# Patient Record
Sex: FEMALE | Race: ASIAN | NOT HISPANIC OR LATINO
[De-identification: names, ages, dates, MRNs, and addresses within clinical notes are randomized per-mention and may not be internally consistent; named-entity substitution may affect disease eponyms.]

---

## 2017-05-11 ENCOUNTER — APPOINTMENT (OUTPATIENT)
Dept: INTERNAL MEDICINE | Facility: CLINIC | Age: 57
End: 2017-05-11

## 2017-05-11 VITALS
OXYGEN SATURATION: 100 % | SYSTOLIC BLOOD PRESSURE: 150 MMHG | WEIGHT: 110 LBS | HEIGHT: 62 IN | HEART RATE: 65 BPM | DIASTOLIC BLOOD PRESSURE: 100 MMHG | BODY MASS INDEX: 20.24 KG/M2 | TEMPERATURE: 98.1 F

## 2017-05-23 ENCOUNTER — NON-APPOINTMENT (OUTPATIENT)
Age: 57
End: 2017-05-23

## 2017-05-23 ENCOUNTER — APPOINTMENT (OUTPATIENT)
Dept: INTERNAL MEDICINE | Facility: CLINIC | Age: 57
End: 2017-05-23

## 2017-05-23 VITALS
OXYGEN SATURATION: 99 % | DIASTOLIC BLOOD PRESSURE: 100 MMHG | HEIGHT: 61.5 IN | HEART RATE: 86 BPM | TEMPERATURE: 98.2 F | SYSTOLIC BLOOD PRESSURE: 150 MMHG | WEIGHT: 111 LBS | BODY MASS INDEX: 20.69 KG/M2

## 2017-05-23 VITALS — DIASTOLIC BLOOD PRESSURE: 92 MMHG | SYSTOLIC BLOOD PRESSURE: 142 MMHG

## 2017-06-22 ENCOUNTER — APPOINTMENT (OUTPATIENT)
Dept: INTERNAL MEDICINE | Facility: CLINIC | Age: 57
End: 2017-06-22

## 2017-06-22 VITALS
WEIGHT: 111 LBS | DIASTOLIC BLOOD PRESSURE: 90 MMHG | BODY MASS INDEX: 20.69 KG/M2 | TEMPERATURE: 99 F | OXYGEN SATURATION: 99 % | HEART RATE: 71 BPM | HEIGHT: 61.5 IN | SYSTOLIC BLOOD PRESSURE: 136 MMHG

## 2017-06-22 VITALS — DIASTOLIC BLOOD PRESSURE: 84 MMHG | SYSTOLIC BLOOD PRESSURE: 128 MMHG

## 2017-06-22 DIAGNOSIS — Z86.2 PERSONAL HISTORY OF DISEASES OF THE BLOOD AND BLOOD-FORMING ORGANS AND CERTAIN DISORDERS INVOLVING THE IMMUNE MECHANISM: ICD-10-CM

## 2017-07-14 LAB
ANION GAP SERPL CALC-SCNC: 15 MMOL/L
BUN SERPL-MCNC: 12 MG/DL
CALCIUM SERPL-MCNC: 9.4 MG/DL
CHLORIDE SERPL-SCNC: 100 MMOL/L
CO2 SERPL-SCNC: 28 MMOL/L
CREAT SERPL-MCNC: 0.6 MG/DL
GLUCOSE SERPL-MCNC: 77 MG/DL
POTASSIUM SERPL-SCNC: 3.3 MMOL/L
SODIUM SERPL-SCNC: 143 MMOL/L

## 2017-08-18 ENCOUNTER — RX RENEWAL (OUTPATIENT)
Age: 57
End: 2017-08-18

## 2017-11-10 ENCOUNTER — LABORATORY RESULT (OUTPATIENT)
Age: 57
End: 2017-11-10

## 2017-11-10 ENCOUNTER — APPOINTMENT (OUTPATIENT)
Dept: INTERNAL MEDICINE | Facility: CLINIC | Age: 57
End: 2017-11-10
Payer: COMMERCIAL

## 2017-11-10 ENCOUNTER — MED ADMIN CHARGE (OUTPATIENT)
Age: 57
End: 2017-11-10

## 2017-11-10 VITALS
TEMPERATURE: 98.3 F | WEIGHT: 112 LBS | DIASTOLIC BLOOD PRESSURE: 88 MMHG | SYSTOLIC BLOOD PRESSURE: 140 MMHG | OXYGEN SATURATION: 99 % | HEART RATE: 79 BPM | BODY MASS INDEX: 20.87 KG/M2 | HEIGHT: 61.5 IN

## 2017-11-10 VITALS — SYSTOLIC BLOOD PRESSURE: 144 MMHG | DIASTOLIC BLOOD PRESSURE: 88 MMHG

## 2017-11-10 DIAGNOSIS — B35.1 TINEA UNGUIUM: ICD-10-CM

## 2017-11-10 PROCEDURE — 36415 COLL VENOUS BLD VENIPUNCTURE: CPT

## 2017-11-10 PROCEDURE — 99396 PREV VISIT EST AGE 40-64: CPT | Mod: 25

## 2017-11-10 PROCEDURE — 90686 IIV4 VACC NO PRSV 0.5 ML IM: CPT

## 2017-11-10 PROCEDURE — G0008: CPT

## 2017-11-10 RX ORDER — DIFLUPREDNATE 0.5 MG/ML
0.05 EMULSION OPHTHALMIC
Qty: 5 | Refills: 0 | Status: DISCONTINUED | COMMUNITY
Start: 2017-06-15

## 2017-11-10 RX ORDER — BESIFLOXACIN 6 MG/ML
0.6 SUSPENSION OPHTHALMIC
Qty: 5 | Refills: 0 | Status: DISCONTINUED | COMMUNITY
Start: 2017-06-15

## 2017-11-21 LAB
25(OH)D3 SERPL-MCNC: 18.6 NG/ML
ALBUMIN SERPL ELPH-MCNC: 4.3 G/DL
ALP BLD-CCNC: 69 U/L
ALT SERPL-CCNC: 24 U/L
ANION GAP SERPL CALC-SCNC: 16 MMOL/L
APPEARANCE: CLEAR
AST SERPL-CCNC: 23 U/L
BASOPHILS # BLD AUTO: 0.01 K/UL
BASOPHILS NFR BLD AUTO: 0.2 %
BILIRUB SERPL-MCNC: 0.2 MG/DL
BILIRUBIN URINE: NEGATIVE
BLOOD URINE: NEGATIVE
BUN SERPL-MCNC: 12 MG/DL
CALCIUM SERPL-MCNC: 9.7 MG/DL
CHLORIDE SERPL-SCNC: 97 MMOL/L
CHOLEST SERPL-MCNC: 271 MG/DL
CHOLEST/HDLC SERPL: 4.4 RATIO
CO2 SERPL-SCNC: 27 MMOL/L
COLOR: YELLOW
CREAT SERPL-MCNC: 0.64 MG/DL
EOSINOPHIL # BLD AUTO: 0.08 K/UL
EOSINOPHIL NFR BLD AUTO: 1.5 %
GLUCOSE QUALITATIVE U: NEGATIVE MG/DL
GLUCOSE SERPL-MCNC: 67 MG/DL
HBA1C MFR BLD HPLC: 5.7 %
HCT VFR BLD CALC: 38.1 %
HDLC SERPL-MCNC: 61 MG/DL
HGB BLD-MCNC: 11.9 G/DL
IMM GRANULOCYTES NFR BLD AUTO: 0.4 %
KETONES URINE: NEGATIVE
LDLC SERPL CALC-MCNC: 139 MG/DL
LEUKOCYTE ESTERASE URINE: NEGATIVE
LYMPHOCYTES # BLD AUTO: 2.01 K/UL
LYMPHOCYTES NFR BLD AUTO: 36.5 %
MAN DIFF?: NORMAL
MCHC RBC-ENTMCNC: 21.8 PG
MCHC RBC-ENTMCNC: 31.2 GM/DL
MCV RBC AUTO: 69.8 FL
MONOCYTES # BLD AUTO: 0.4 K/UL
MONOCYTES NFR BLD AUTO: 7.3 %
NEUTROPHILS # BLD AUTO: 2.99 K/UL
NEUTROPHILS NFR BLD AUTO: 54.1 %
NITRITE URINE: NEGATIVE
PH URINE: 6.5
PLATELET # BLD AUTO: 310 K/UL
POTASSIUM SERPL-SCNC: 3.3 MMOL/L
PROT SERPL-MCNC: 7.9 G/DL
PROTEIN URINE: NEGATIVE MG/DL
RBC # BLD: 5.46 M/UL
RBC # FLD: 16.1 %
SODIUM SERPL-SCNC: 140 MMOL/L
SPECIFIC GRAVITY URINE: 1.01
T4 FREE SERPL-MCNC: 1.5 NG/DL
TRIGL SERPL-MCNC: 353 MG/DL
TSH SERPL-ACNC: 2.22 UIU/ML
UROBILINOGEN URINE: NEGATIVE MG/DL
WBC # FLD AUTO: 5.51 K/UL

## 2017-12-01 ENCOUNTER — APPOINTMENT (OUTPATIENT)
Dept: INTERNAL MEDICINE | Facility: CLINIC | Age: 57
End: 2017-12-01
Payer: COMMERCIAL

## 2017-12-01 VITALS
DIASTOLIC BLOOD PRESSURE: 100 MMHG | WEIGHT: 112 LBS | TEMPERATURE: 99.2 F | OXYGEN SATURATION: 97 % | BODY MASS INDEX: 20.87 KG/M2 | SYSTOLIC BLOOD PRESSURE: 130 MMHG | HEART RATE: 84 BPM | HEIGHT: 61.5 IN

## 2017-12-01 VITALS — SYSTOLIC BLOOD PRESSURE: 128 MMHG | DIASTOLIC BLOOD PRESSURE: 92 MMHG

## 2017-12-01 PROCEDURE — 87880 STREP A ASSAY W/OPTIC: CPT | Mod: QW

## 2017-12-01 PROCEDURE — 99214 OFFICE O/P EST MOD 30 MIN: CPT

## 2017-12-01 RX ORDER — HYDROCHLOROTHIAZIDE 25 MG/1
25 TABLET ORAL DAILY
Qty: 30 | Refills: 4 | Status: DISCONTINUED | COMMUNITY
Start: 2017-05-23 | End: 2017-12-01

## 2017-12-01 RX ORDER — POTASSIUM CHLORIDE 1500 MG/1
20 TABLET, FILM COATED, EXTENDED RELEASE ORAL
Qty: 30 | Refills: 0 | Status: DISCONTINUED | COMMUNITY
Start: 2017-11-21

## 2017-12-04 LAB — BACTERIA THROAT CULT: NORMAL

## 2017-12-14 LAB — S PYO AG SPEC QL IA: NORMAL

## 2017-12-29 ENCOUNTER — APPOINTMENT (OUTPATIENT)
Dept: INTERNAL MEDICINE | Facility: CLINIC | Age: 57
End: 2017-12-29
Payer: COMMERCIAL

## 2017-12-29 VITALS
HEIGHT: 61.5 IN | WEIGHT: 112 LBS | TEMPERATURE: 98.2 F | BODY MASS INDEX: 20.87 KG/M2 | SYSTOLIC BLOOD PRESSURE: 130 MMHG | HEART RATE: 78 BPM | OXYGEN SATURATION: 99 % | DIASTOLIC BLOOD PRESSURE: 86 MMHG

## 2017-12-29 VITALS — DIASTOLIC BLOOD PRESSURE: 84 MMHG | SYSTOLIC BLOOD PRESSURE: 132 MMHG

## 2017-12-29 PROCEDURE — 99213 OFFICE O/P EST LOW 20 MIN: CPT

## 2018-01-02 LAB
ANION GAP SERPL CALC-SCNC: 13 MMOL/L
BUN SERPL-MCNC: 13 MG/DL
CALCIUM SERPL-MCNC: 9.5 MG/DL
CHLORIDE SERPL-SCNC: 101 MMOL/L
CO2 SERPL-SCNC: 27 MMOL/L
CREAT SERPL-MCNC: 0.7 MG/DL
GLUCOSE SERPL-MCNC: 100 MG/DL
POTASSIUM SERPL-SCNC: 3.6 MMOL/L
SODIUM SERPL-SCNC: 141 MMOL/L

## 2018-02-28 ENCOUNTER — RX RENEWAL (OUTPATIENT)
Age: 58
End: 2018-02-28

## 2018-05-03 ENCOUNTER — RX RENEWAL (OUTPATIENT)
Age: 58
End: 2018-05-03

## 2018-05-29 ENCOUNTER — RX RENEWAL (OUTPATIENT)
Age: 58
End: 2018-05-29

## 2018-07-15 ENCOUNTER — FORM ENCOUNTER (OUTPATIENT)
Age: 58
End: 2018-07-15

## 2018-07-16 ENCOUNTER — OUTPATIENT (OUTPATIENT)
Dept: OUTPATIENT SERVICES | Facility: HOSPITAL | Age: 58
LOS: 1 days | End: 2018-07-16
Payer: COMMERCIAL

## 2018-07-16 ENCOUNTER — APPOINTMENT (OUTPATIENT)
Dept: MAMMOGRAPHY | Facility: IMAGING CENTER | Age: 58
End: 2018-07-16
Payer: COMMERCIAL

## 2018-07-16 DIAGNOSIS — Z00.8 ENCOUNTER FOR OTHER GENERAL EXAMINATION: ICD-10-CM

## 2018-07-16 DIAGNOSIS — M41.9 SCOLIOSIS, UNSPECIFIED: Chronic | ICD-10-CM

## 2018-07-16 PROCEDURE — 77067 SCR MAMMO BI INCL CAD: CPT

## 2018-07-16 PROCEDURE — 77063 BREAST TOMOSYNTHESIS BI: CPT

## 2018-07-16 PROCEDURE — 77067 SCR MAMMO BI INCL CAD: CPT | Mod: 26

## 2018-07-16 PROCEDURE — 77063 BREAST TOMOSYNTHESIS BI: CPT | Mod: 26

## 2018-09-28 ENCOUNTER — INPATIENT (INPATIENT)
Facility: HOSPITAL | Age: 58
LOS: 4 days | Discharge: HOME | DRG: 419 | End: 2018-10-03
Attending: SURGERY | Admitting: HOSPITALIST
Payer: SELF-PAY

## 2018-09-28 VITALS
HEART RATE: 97 BPM | OXYGEN SATURATION: 98 % | SYSTOLIC BLOOD PRESSURE: 91 MMHG | RESPIRATION RATE: 14 BRPM | DIASTOLIC BLOOD PRESSURE: 57 MMHG | TEMPERATURE: 98 F

## 2018-09-28 DIAGNOSIS — I10 ESSENTIAL (PRIMARY) HYPERTENSION: ICD-10-CM

## 2018-09-28 DIAGNOSIS — K80.50 CALCULUS OF BILE DUCT WITHOUT CHOLANGITIS OR CHOLECYSTITIS WITHOUT OBSTRUCTION: ICD-10-CM

## 2018-09-28 DIAGNOSIS — M41.9 SCOLIOSIS, UNSPECIFIED: ICD-10-CM

## 2018-09-28 DIAGNOSIS — K85.10 BILIARY ACUTE PANCREATITIS WITHOUT NECROSIS OR INFECTION: ICD-10-CM

## 2018-09-28 DIAGNOSIS — M41.9 SCOLIOSIS, UNSPECIFIED: Chronic | ICD-10-CM

## 2018-09-28 DIAGNOSIS — Z29.9 ENCOUNTER FOR PROPHYLACTIC MEASURES, UNSPECIFIED: ICD-10-CM

## 2018-09-28 DIAGNOSIS — R10.13 EPIGASTRIC PAIN: ICD-10-CM

## 2018-09-28 LAB
ALBUMIN SERPL ELPH-MCNC: 4.1 G/DL — SIGNIFICANT CHANGE UP (ref 3.3–5)
ALP SERPL-CCNC: 98 U/L — SIGNIFICANT CHANGE UP (ref 40–120)
ALT FLD-CCNC: 337 U/L — HIGH (ref 10–45)
ANION GAP SERPL CALC-SCNC: 11 MMOL/L — SIGNIFICANT CHANGE UP (ref 5–17)
APPEARANCE UR: ABNORMAL
APTT BLD: 27.7 SEC — SIGNIFICANT CHANGE UP (ref 27.5–37.4)
AST SERPL-CCNC: 545 U/L — HIGH (ref 10–40)
BACTERIA # UR AUTO: NEGATIVE — SIGNIFICANT CHANGE UP
BASE EXCESS BLDV CALC-SCNC: 1 MMOL/L — SIGNIFICANT CHANGE UP (ref -2–2)
BASOPHILS # BLD AUTO: 0 K/UL — SIGNIFICANT CHANGE UP (ref 0–0.2)
BASOPHILS NFR BLD AUTO: 0.2 % — SIGNIFICANT CHANGE UP (ref 0–2)
BILIRUB SERPL-MCNC: 1.8 MG/DL — HIGH (ref 0.2–1.2)
BILIRUB UR-MCNC: ABNORMAL
BUN SERPL-MCNC: 12 MG/DL — SIGNIFICANT CHANGE UP (ref 7–23)
CA-I SERPL-SCNC: 1.12 MMOL/L — SIGNIFICANT CHANGE UP (ref 1.12–1.3)
CALCIUM SERPL-MCNC: 9.2 MG/DL — SIGNIFICANT CHANGE UP (ref 8.4–10.5)
CHLORIDE BLDV-SCNC: 109 MMOL/L — HIGH (ref 96–108)
CHLORIDE SERPL-SCNC: 102 MMOL/L — SIGNIFICANT CHANGE UP (ref 96–108)
CO2 BLDV-SCNC: 29 MMOL/L — SIGNIFICANT CHANGE UP (ref 22–30)
CO2 SERPL-SCNC: 26 MMOL/L — SIGNIFICANT CHANGE UP (ref 22–31)
COLOR SPEC: YELLOW — SIGNIFICANT CHANGE UP
CREAT SERPL-MCNC: 0.71 MG/DL — SIGNIFICANT CHANGE UP (ref 0.5–1.3)
DIFF PNL FLD: NEGATIVE — SIGNIFICANT CHANGE UP
EOSINOPHIL # BLD AUTO: 0 K/UL — SIGNIFICANT CHANGE UP (ref 0–0.5)
EOSINOPHIL NFR BLD AUTO: 0.3 % — SIGNIFICANT CHANGE UP (ref 0–6)
EPI CELLS # UR: 1 /HPF — SIGNIFICANT CHANGE UP
GAS PNL BLDV: 137 MMOL/L — SIGNIFICANT CHANGE UP (ref 136–145)
GAS PNL BLDV: SIGNIFICANT CHANGE UP
GAS PNL BLDV: SIGNIFICANT CHANGE UP
GLUCOSE BLDV-MCNC: 131 MG/DL — HIGH (ref 70–99)
GLUCOSE SERPL-MCNC: 148 MG/DL — HIGH (ref 70–99)
GLUCOSE UR QL: ABNORMAL
HCO3 BLDV-SCNC: 27 MMOL/L — SIGNIFICANT CHANGE UP (ref 21–29)
HCT VFR BLD CALC: 35.4 % — SIGNIFICANT CHANGE UP (ref 34.5–45)
HCT VFR BLDA CALC: 32 % — LOW (ref 39–50)
HGB BLD CALC-MCNC: 10.2 G/DL — LOW (ref 11.5–15.5)
HGB BLD-MCNC: 11.4 G/DL — LOW (ref 11.5–15.5)
HYALINE CASTS # UR AUTO: 1 /LPF — SIGNIFICANT CHANGE UP (ref 0–2)
INR BLD: 1.04 RATIO — SIGNIFICANT CHANGE UP (ref 0.88–1.16)
KETONES UR-MCNC: SIGNIFICANT CHANGE UP
LACTATE BLDV-MCNC: 1.1 MMOL/L — SIGNIFICANT CHANGE UP (ref 0.7–2)
LEUKOCYTE ESTERASE UR-ACNC: NEGATIVE — SIGNIFICANT CHANGE UP
LIDOCAIN IGE QN: 175 U/L — HIGH (ref 7–60)
LYMPHOCYTES # BLD AUTO: 0.8 K/UL — LOW (ref 1–3.3)
LYMPHOCYTES # BLD AUTO: 14.6 % — SIGNIFICANT CHANGE UP (ref 13–44)
MCHC RBC-ENTMCNC: 22.1 PG — LOW (ref 27–34)
MCHC RBC-ENTMCNC: 32.2 GM/DL — SIGNIFICANT CHANGE UP (ref 32–36)
MCV RBC AUTO: 68.6 FL — LOW (ref 80–100)
MONOCYTES # BLD AUTO: 0.3 K/UL — SIGNIFICANT CHANGE UP (ref 0–0.9)
MONOCYTES NFR BLD AUTO: 5 % — SIGNIFICANT CHANGE UP (ref 2–14)
NEUTROPHILS # BLD AUTO: 4.3 K/UL — SIGNIFICANT CHANGE UP (ref 1.8–7.4)
NEUTROPHILS NFR BLD AUTO: 79.9 % — HIGH (ref 43–77)
NITRITE UR-MCNC: NEGATIVE — SIGNIFICANT CHANGE UP
PCO2 BLDV: 54 MMHG — HIGH (ref 35–50)
PH BLDV: 7.32 — LOW (ref 7.35–7.45)
PH UR: 6 — SIGNIFICANT CHANGE UP (ref 5–8)
PLATELET # BLD AUTO: 254 K/UL — SIGNIFICANT CHANGE UP (ref 150–400)
PO2 BLDV: 33 MMHG — SIGNIFICANT CHANGE UP (ref 25–45)
POTASSIUM BLDV-SCNC: 3.3 MMOL/L — LOW (ref 3.5–5.3)
POTASSIUM SERPL-MCNC: 3.7 MMOL/L — SIGNIFICANT CHANGE UP (ref 3.5–5.3)
POTASSIUM SERPL-SCNC: 3.7 MMOL/L — SIGNIFICANT CHANGE UP (ref 3.5–5.3)
PROT SERPL-MCNC: 7.1 G/DL — SIGNIFICANT CHANGE UP (ref 6–8.3)
PROT UR-MCNC: ABNORMAL
PROTHROM AB SERPL-ACNC: 11.3 SEC — SIGNIFICANT CHANGE UP (ref 9.8–12.7)
RBC # BLD: 5.16 M/UL — SIGNIFICANT CHANGE UP (ref 3.8–5.2)
RBC # FLD: 13.3 % — SIGNIFICANT CHANGE UP (ref 10.3–14.5)
RBC CASTS # UR COMP ASSIST: 7 /HPF — HIGH (ref 0–4)
SAO2 % BLDV: 51 % — LOW (ref 67–88)
SODIUM SERPL-SCNC: 139 MMOL/L — SIGNIFICANT CHANGE UP (ref 135–145)
SP GR SPEC: 1.03 — HIGH (ref 1.01–1.02)
TROPONIN T, HIGH SENSITIVITY RESULT: <6 NG/L — SIGNIFICANT CHANGE UP (ref 0–51)
TROPONIN T, HIGH SENSITIVITY RESULT: <6 NG/L — SIGNIFICANT CHANGE UP (ref 0–51)
UROBILINOGEN FLD QL: ABNORMAL
WBC # BLD: 5.4 K/UL — SIGNIFICANT CHANGE UP (ref 3.8–10.5)
WBC # FLD AUTO: 5.4 K/UL — SIGNIFICANT CHANGE UP (ref 3.8–10.5)
WBC UR QL: 3 /HPF — SIGNIFICANT CHANGE UP (ref 0–5)

## 2018-09-28 PROCEDURE — 78226 HEPATOBILIARY SYSTEM IMAGING: CPT | Mod: 26

## 2018-09-28 PROCEDURE — 99285 EMERGENCY DEPT VISIT HI MDM: CPT | Mod: 25

## 2018-09-28 PROCEDURE — 99253 IP/OBS CNSLTJ NEW/EST LOW 45: CPT | Mod: GC

## 2018-09-28 PROCEDURE — 76700 US EXAM ABDOM COMPLETE: CPT | Mod: 26

## 2018-09-28 PROCEDURE — 93010 ELECTROCARDIOGRAM REPORT: CPT

## 2018-09-28 PROCEDURE — 71045 X-RAY EXAM CHEST 1 VIEW: CPT | Mod: 26

## 2018-09-28 PROCEDURE — 99223 1ST HOSP IP/OBS HIGH 75: CPT | Mod: GC

## 2018-09-28 PROCEDURE — 74177 CT ABD & PELVIS W/CONTRAST: CPT | Mod: 26

## 2018-09-28 RX ORDER — LOSARTAN POTASSIUM 100 MG/1
0 TABLET, FILM COATED ORAL
Qty: 0 | Refills: 0 | COMMUNITY

## 2018-09-28 RX ORDER — SODIUM CHLORIDE 9 MG/ML
1000 INJECTION INTRAMUSCULAR; INTRAVENOUS; SUBCUTANEOUS
Qty: 0 | Refills: 0 | Status: DISCONTINUED | OUTPATIENT
Start: 2018-09-28 | End: 2018-10-01

## 2018-09-28 RX ORDER — ONDANSETRON 8 MG/1
4 TABLET, FILM COATED ORAL ONCE
Qty: 0 | Refills: 0 | Status: COMPLETED | OUTPATIENT
Start: 2018-09-28 | End: 2018-09-28

## 2018-09-28 RX ORDER — ENOXAPARIN SODIUM 100 MG/ML
40 INJECTION SUBCUTANEOUS EVERY 24 HOURS
Qty: 0 | Refills: 0 | Status: DISCONTINUED | OUTPATIENT
Start: 2018-09-28 | End: 2018-10-01

## 2018-09-28 RX ORDER — SODIUM CHLORIDE 9 MG/ML
1000 INJECTION INTRAMUSCULAR; INTRAVENOUS; SUBCUTANEOUS ONCE
Qty: 0 | Refills: 0 | Status: COMPLETED | OUTPATIENT
Start: 2018-09-28 | End: 2018-09-28

## 2018-09-28 RX ORDER — ONDANSETRON 8 MG/1
4 TABLET, FILM COATED ORAL EVERY 8 HOURS
Qty: 0 | Refills: 0 | Status: DISCONTINUED | OUTPATIENT
Start: 2018-09-28 | End: 2018-10-01

## 2018-09-28 RX ORDER — ACETAMINOPHEN 500 MG
650 TABLET ORAL EVERY 6 HOURS
Qty: 0 | Refills: 0 | Status: DISCONTINUED | OUTPATIENT
Start: 2018-09-28 | End: 2018-09-28

## 2018-09-28 RX ORDER — MORPHINE SULFATE 50 MG/1
2 CAPSULE, EXTENDED RELEASE ORAL EVERY 4 HOURS
Qty: 0 | Refills: 0 | Status: DISCONTINUED | OUTPATIENT
Start: 2018-09-28 | End: 2018-10-01

## 2018-09-28 RX ORDER — METOCLOPRAMIDE HCL 10 MG
10 TABLET ORAL ONCE
Qty: 0 | Refills: 0 | Status: COMPLETED | OUTPATIENT
Start: 2018-09-28 | End: 2018-09-28

## 2018-09-28 RX ORDER — PIPERACILLIN AND TAZOBACTAM 4; .5 G/20ML; G/20ML
3.38 INJECTION, POWDER, LYOPHILIZED, FOR SOLUTION INTRAVENOUS ONCE
Qty: 0 | Refills: 0 | Status: COMPLETED | OUTPATIENT
Start: 2018-09-28 | End: 2018-09-28

## 2018-09-28 RX ORDER — LOSARTAN/HYDROCHLOROTHIAZIDE 100MG-25MG
1 TABLET ORAL
Qty: 0 | Refills: 0 | COMMUNITY

## 2018-09-28 RX ORDER — MORPHINE SULFATE 50 MG/1
4 CAPSULE, EXTENDED RELEASE ORAL ONCE
Qty: 0 | Refills: 0 | Status: DISCONTINUED | OUTPATIENT
Start: 2018-09-28 | End: 2018-09-28

## 2018-09-28 RX ORDER — ACETAMINOPHEN 500 MG
1000 TABLET ORAL ONCE
Qty: 0 | Refills: 0 | Status: COMPLETED | OUTPATIENT
Start: 2018-09-28 | End: 2018-09-28

## 2018-09-28 RX ADMIN — Medication 400 MILLIGRAM(S): at 10:28

## 2018-09-28 RX ADMIN — SODIUM CHLORIDE 100 MILLILITER(S): 9 INJECTION INTRAMUSCULAR; INTRAVENOUS; SUBCUTANEOUS at 19:52

## 2018-09-28 RX ADMIN — Medication 10 MILLIGRAM(S): at 14:00

## 2018-09-28 RX ADMIN — PIPERACILLIN AND TAZOBACTAM 200 GRAM(S): 4; .5 INJECTION, POWDER, LYOPHILIZED, FOR SOLUTION INTRAVENOUS at 12:53

## 2018-09-28 RX ADMIN — ONDANSETRON 4 MILLIGRAM(S): 8 TABLET, FILM COATED ORAL at 19:51

## 2018-09-28 RX ADMIN — Medication 1000 MILLIGRAM(S): at 10:31

## 2018-09-28 RX ADMIN — ONDANSETRON 4 MILLIGRAM(S): 8 TABLET, FILM COATED ORAL at 12:53

## 2018-09-28 RX ADMIN — MORPHINE SULFATE 4 MILLIGRAM(S): 50 CAPSULE, EXTENDED RELEASE ORAL at 10:24

## 2018-09-28 RX ADMIN — Medication 1000 MILLIGRAM(S): at 10:40

## 2018-09-28 RX ADMIN — ONDANSETRON 4 MILLIGRAM(S): 8 TABLET, FILM COATED ORAL at 10:24

## 2018-09-28 RX ADMIN — SODIUM CHLORIDE 1000 MILLILITER(S): 9 INJECTION INTRAMUSCULAR; INTRAVENOUS; SUBCUTANEOUS at 10:27

## 2018-09-28 RX ADMIN — MORPHINE SULFATE 4 MILLIGRAM(S): 50 CAPSULE, EXTENDED RELEASE ORAL at 10:27

## 2018-09-28 RX ADMIN — SODIUM CHLORIDE 2000 MILLILITER(S): 9 INJECTION INTRAMUSCULAR; INTRAVENOUS; SUBCUTANEOUS at 14:01

## 2018-09-28 NOTE — ED ADULT NURSE NOTE - NSIMPLEMENTINTERV_GEN_ALL_ED
Implemented All Universal Safety Interventions:  [place] to call system. Call bell, personal items and telephone within reach. Instruct patient to call for assistance. Room bathroom lighting operational. Non-slip footwear when patient is off stretcher. Physically safe environment: no spills, clutter or unnecessary equipment. Stretcher in lowest position, wheels locked, appropriate side rails in place.

## 2018-09-28 NOTE — H&P ADULT - HISTORY OF PRESENT ILLNESS
57yoF with PMH with hypertension, diverticulitis (2016), scoliosis, C diff (2014) presenting with one day history of back pain radiating to the chest and epigastric area. Patient states yesterday morning she began with back pain radiating to her substernal chest. The pain self-resolved and then came back yesterday night after she had dinner and began radiating to her epigastrium. Pain is constant, 9/10 in severity, worse with food, improved on morphine but not on advil, patient was unable to describe the quality of the pain. Patient also reports nausea with 5 episodes of nonbloody vomiting since yesterday. Patient also reports some chills but denies fevers. Patient's last bowel movement was yesterday morning and normal. Patient denies weight loss/changes in stool color or calibur/hematochezia/recent travel/shortness of breath. 57 year old female with past medical history of hypertension, diverticulitis (2016), scoliosis, C diff (2014) presenting with one day history of back pain radiating to the chest and epigastric area. Patient states yesterday morning she began with back pain radiating to her substernal chest. The pain self-resolved and then came back yesterday night after she had dinner and began radiating to her epigastrium. Pain is constant, 9/10 in severity, worse with food, improved on morphine but not on advil, patient was unable to describe the quality of the pain. Patient also reports nausea with 5 episodes of nonbloody vomiting since yesterday. Patient also reports some chills but denies fevers. Patient's last bowel movement was yesterday morning and normal. Patient denies weight loss/changes in stool color or calibur/hematochezia/recent travel/shortness of breath.

## 2018-09-28 NOTE — CONSULT NOTE ADULT - ASSESSMENT
57 year old female with HTN, diverticulosis, and scoliosis (s/p surgery with tal placement) presented to the emergency room with chief complain of chest pain and abdominal pain for one day.     IMPRESSION  - Abdominal pain, concern for gallstones and ?choledocholithiasis  Tbili 1.8, CBD is 8mm, elevated AST and ALT, no signs of cholangitis    RECOMMENDATION    - trend CBC, CMP, INR  - please obtain a CT abdomen with contrast to rule out other causes of abdominal pain   - patient can not get an MRI given she has a metal tal in her back and she is claustrophobic, thus will plan for an EUS +/- ERCP   - please keep her NPO for now  - supportive care as per primary team    Patricia Richardson, PGY-5  GI fellow  B- [id number]/ [phone]  Please call GI fellow on call after 5pm and on weekends 57 year old female with HTN, diverticulosis, and scoliosis (s/p surgery with tal placement) presented to the emergency room with chief complain of chest pain and abdominal pain for one day.     IMPRESSION  - Abdominal pain, concern for gallstones and ?choledocholithiasis  - Acute pancreatitis, typical pain and elevated lipase  Tbili 1.8, CBD is 8mm, elevated AST and ALT, no signs of cholangitis    RECOMMENDATION    - trend CBC, CMP, INR  - please continue IVF for rx of pancreatitis   - please obtain a CT abdomen with contrast to rule out other causes of abdominal pain   - patient can not get an MRI given she has a metal tal in her back and she is claustrophobic, thus will plan for an EUS +/- ERCP   - please keep her NPO for now  - supportive care as per primary team    Patricia Richardson, PGY-5  GI fellow  B- [phone]/ [phone]  Please call GI fellow on call after 5pm and on weekends

## 2018-09-28 NOTE — ED ADULT NURSE REASSESSMENT NOTE - NS ED NURSE REASSESS COMMENT FT1
pt reports total relief from pain at this time, she is being transported off unit for u/s. will continue to monitor at return.

## 2018-09-28 NOTE — ED PROVIDER NOTE - MEDICAL DECISION MAKING DETAILS
56 yo F with pmhx htn, diverticulitis, and scoliosis presenting with chest pain and abdominal pain. PE unremarkable besides RUQ and epigastric pain. Will obtain labs, ekg, cardiac monitor, urine, cxr and US. 58 yo F with pmhx htn, diverticulitis, and scoliosis presenting with chest pain and abdominal pain. PE unremarkable besides RUQ and epigastric pain. Will obtain labs, ekg, cardiac monitor, urine, cxr and US.  Jase: See attending statement below

## 2018-09-28 NOTE — CONSULT NOTE ADULT - ASSESSMENT
Patient is a 57 year old female with history of diverticulosis/diverticulitis, HTN, scoliosis with surgery, metallic tal, who reports yesterday she began to have chest pain  radiating to her back , right side abdominal discomfort with nausea and vomiting. She reports when she tried to eat thereafter she had the pain again .   She denies fever, chills, had a BM  this morning , denies any BRBPR or melena   Elevated LFTs    Radiologist recommends HIDA to further evaluate sono findings   MRI/MRCP contraindicated with metallic tal in back ( prior scoliosis surgery )        Recommendations:  Monitor labs and trends as ordered   Repeat LFTs in am   HIDA scan to further evaluate sono findings (as recommended by radiology )  Keep NPO for HIDA   Continued recommendations pending result of HIDA and clinical course     Patricia Luna, [facility]  [address] [place] [address]  [address] [phone] Patient is a 57 year old female with history of diverticulosis/diverticulitis, HTN, scoliosis with surgery, metallic tal, who reports yesterday she began to have chest pain  radiating to her back , right side abdominal discomfort with nausea and vomiting. She reports when she tried to eat thereafter she had the pain again .   She denies fever, chills, had a BM  this morning , denies any BRBPR or melena   Elevated LFTs    Radiologist recommends HIDA to further evaluate sono findings   MRI/MRCP contraindicated with metallic tal in back ( prior scoliosis surgery )        Recommendations:  Monitor labs and trends as ordered   Repeat LFTs in am   HIDA scan to further evaluate sono findings (as recommended by radiology )  Keep NPO for HIDA   Continued recommendations pending result of HIDA and clinical course   Patient may need CT scan if HIDA non diagnostic     [facility]  [address] [place] [address]  [address] [phone]

## 2018-09-28 NOTE — H&P ADULT - ASSESSMENT
57yoF with PMH with hypertension, diverticulitis (2016), scoliosis, C diff (2014) presenting with one day history of back pain radiating to the chest and epigastric area. Patient found to have elevated lipase and gallstones with mild duct dilation. Pain likely secondary to biliary pancreatitis vs choledocholithiasis vs acute cholecystitis vs cholangitis.

## 2018-09-28 NOTE — ED ADULT NURSE NOTE - PMH
Diverticulitis of intestine without perforation or abscess with bleeding, unspecified part of intestinal tract    Hypertension    Scoliosis

## 2018-09-28 NOTE — ED PROVIDER NOTE - ATTENDING CONTRIBUTION TO CARE
57y F hx of HTN, diverticulitis, and scoliosis sp spinal surgery presenting with 2 days of epigastric pain radiating to back and RUQ pain. Pt states back and chest pain are constant and difficult for her to describe, however the RUQ pain is intermittent and worse w palpation and eating. States that she was nauseated and vomited about 5x yesterday. NO fevers. No SOB.   Gen: WNWD NAD  HEENT: NCAT EOMI   Back: midline surgical scar from upper T spine to L spine, midline TTP mid T spine   Abd: +BS soft epigastric and RUQ TTP +[place]  Ext: wwp, palp pulses, FROMx4, no cce  Neuro: A&Ox3, CN grossly intact, sensation intact, motor 5/5 throughout  AP: 57y F hx of HTN, diverticulitis, and scoliosis sp spinal surgery presenting with 2 days of epigastric pain radiating to back and RUQ pain. Suspect  biliary etiology. Check labs, sono, meds, re-eval. 57y F hx of HTN, diverticulitis, and scoliosis sp spinal surgery presenting with 2 days of epigastric pain radiating to back and RUQ pain. Pt states back and chest pain are constant and difficult for her to describe, however the RUQ pain is intermittent and worse w palpation and eating. States that she was nauseated and vomited about 5x yesterday. NO fevers. No SOB. +Fhx of CAD, early MI. Pt cathed in Dec 2016 showing minor CAD.   Gen: WNWD NAD  HEENT: NCAT EOMI   Back: midline surgical scar from upper T spine to L spine, midline TTP mid T spine   Abd: +BS soft epigastric and RUQ TTP +[place]  Ext: wwp, palp pulses, FROMx4, no cce  Neuro: A&Ox3, CN grossly intact, sensation intact, motor 5/5 throughout  AP: 57y F hx of HTN, diverticulitis, and scoliosis sp spinal surgery presenting with 2 days of epigastric pain radiating to back and RUQ pain. Suspect  biliary etiology. Check labs, sono, meds, re-eval.

## 2018-09-28 NOTE — H&P ADULT - NSHPSOCIALHISTORY_GEN_ALL_CORE
Patient lives at home with her  and has two children. 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 She is a never smoker, social drinker and denies illicit drug use. She is sexually active with her  and denies history of STDs.

## 2018-09-28 NOTE — CONSULT NOTE ADULT - SUBJECTIVE AND OBJECTIVE BOX
Chief Complaint:  Patient is a 57y old  Female who presents with a chief complaint of     HPI:    Allergies:  aspirin (Rash)      Home Medications:    Hospital Medications:      PMHX/PSHX:  Hypertension  Diverticulitis of intestine without perforation or abscess with bleeding, unspecified part of intestinal tract  Scoliosis  Scoliosis      Family history:  Family history of early CAD (Sibling)  Family history of lung cancer  Family history of hypertension      Social History:     ROS:     General:  No wt loss, fevers, chills, night sweats, fatigue,   Eyes:  Good vision, no reported pain  ENT:  No sore throat, pain, runny nose, dysphagia  CV:  No pain, palpitations, hypo/hypertension  Resp:  No dyspnea, cough, tachypnea, wheezing  GI:  See HPI  :  No pain, bleeding, incontinence, nocturia  Muscle:  No pain, weakness  Neuro:  No weakness, tingling, memory problems  Psych:  No fatigue, insomnia, mood problems, depression  Endocrine:  No polyuria, polydipsia, cold/heat intolerance  Heme:  No petechiae, ecchymosis, easy bruisability  Skin:  No rash, edema      PHYSICAL EXAM:     GENERAL:  Appears stated age, well-groomed, well-nourished, no distress  HEENT:  NC/AT,  conjunctivae clear and pink,  no JVD  CHEST:  Full & symmetric excursion, no increased effort, breath sounds clear  HEART:  Regular rhythm, S1, S2, no murmur/rub/S3/S4, no abdominal bruit, no edema  ABDOMEN:  Soft, non-tender, non-distended, normoactive bowel sounds,  no masses ,  EXTREMITIES:  no cyanosis,clubbing or edema  SKIN:  No rash/erythema/ecchymoses/petechiae/wounds/abscess/warm/dry  NEURO:  Alert, oriented    Vital Signs:  Vital Signs Last 24 Hrs  T(C): 36.6 (28 Sep 2018 10:06), Max: 36.8 (28 Sep 2018 09:54)  T(F): 97.9 (28 Sep 2018 10:06), Max: 98.2 (28 Sep 2018 09:54)  HR: 54 (28 Sep 2018 10:44) (54 - 97)  BP: 116/64 (28 Sep 2018 10:44) (91/57 - 116/64)  BP(mean): --  RR: 17 (28 Sep 2018 10:44) (14 - 18)  SpO2: 100% (28 Sep 2018 10:44) (98% - 100%)  Daily     Daily     LABS:                        11.4   5.4   )-----------( 254      ( 28 Sep 2018 10:26 )             35.4     09-28    139  |  102  |  12  ----------------------------<  148<H>  3.7   |  26  |  0.71    Ca    9.2      28 Sep 2018 10:26    TPro  7.1  /  Alb  4.1  /  TBili  1.8<H>  /  DBili  x   /  AST  545<H>  /  ALT  337<H>  /  AlkPhos  98  09-28    LIVER FUNCTIONS - ( 28 Sep 2018 10:26 )  Alb: 4.1 g/dL / Pro: 7.1 g/dL / ALK PHOS: 98 U/L / ALT: 337 U/L / AST: 545 U/L / GGT: x           PT/INR - ( 28 Sep 2018 10:26 )   PT: 11.3 sec;   INR: 1.04 ratio         PTT - ( 28 Sep 2018 10:26 )  PTT:27.7 sec    Amylase Serum--      Lipase [id number]       Ammonia--      Imaging:    < from: US Abdomen Complete (09.28.18 @ 11:42) >  FINDINGS:    Liver: Within normal limits.    Bile ducts: The common bile duct is mildly dilated and measures8 mm.    Gallbladder: Distended gallbladder containing numerous tiny mobile   gallstones. No gallbladder wall thickening or pericholecystic fluid.   Evaluation of sonographic Man sign is unreliable as the patient   received pain medication.       Pancreas: Visualized portions are within normal limits.    Spleen: 9.9 cm. Within normal limits.    Right kidney: 10.3 cm. No hydronephrosis.     Left kidney: 9.2 cm.  No hydronephrosis.     Ascites: None.    Aorta and IVC: Visualized portions are within normal limits.    IMPRESSION:     Distended gallbladder containing numerous tiny mobile gallstones. Normal   gallbladder wall thickness.  Man's sign was unreliable as the patient   had received pain medication. Further evaluation with hepatobiliary scan   is suggested to assess for cholecystitis.    Mild dilatation of the common bile duct.      < end of copied text > BILIARY GI   Chief Complaint:  Patient is a 57y old  Female who presents with a chief complaint of abdominal pain.   Primary GI: Dr Witt    HPI:  57 year old female with HTN, diverticulosis, and scoliosis (s/p surgery with tla placement) presented to the emergency room with chief complain of chest pain and abdominal pain for one day.   Patient states that her symptoms were sudden in onset, location in RUQ, epigastric area and the back. Pain is intermittent, 8/10 at its worse, worsens with food, associated with NBNB vomiting without blood. She denies fever, chills, changes in BMs, melena, BRBPR.     Allergies:  aspirin (Rash)      Home Medications:  Patient Currently Takes Medications as of 28-Sep-2018 09:58 documented in Structured Notes  · 	losartan: Last Dose Taken:    Hospital Medications:      PMHX/PSHX:  Hypertension  Diverticulitis of intestine without perforation or abscess with bleeding, unspecified part of intestinal tract  Scoliosis  Scoliosis      Family history:  Family history of early CAD (Sibling)  Family history of lung cancer  Family history of hypertension  Denies family history of colon cancer, liver cancer, uterine cancer, ovarian cancer, breast cancer, gastric ulcers, colon polyps, gallstones    Social History:   denies smoking, alcohol, drug use.       ROS:     General:  No wt loss, fevers, chills, night sweats, fatigue,   Eyes:  Good vision, no reported pain  ENT:  No sore throat, pain, runny nose, dysphagia  CV:  No pain, palpitations, hypo/hypertension  Resp:  No dyspnea, cough, tachypnea, wheezing  GI:  See HPI  :  No pain, bleeding, incontinence, nocturia  Muscle:  No pain, weakness  Neuro:  No weakness, tingling, memory problems  Psych:  No fatigue, insomnia, mood problems, depression  Endocrine:  No polyuria, polydipsia, cold/heat intolerance  Heme:  No petechiae, ecchymosis, easy bruisability  Skin:  No rash, edema      PHYSICAL EXAM:     GENERAL:  Appears stated age, well-groomed, well-nourished, no distress  HEENT:  NC/AT,  conjunctivae clear and pink,  no JVD  CHEST:  Full & symmetric excursion, no increased effort, breath sounds clear  HEART:  Regular rhythm, S1, S2, no murmur/rub/S3/S4, no abdominal bruit, no edema  ABDOMEN:  Soft, non-tender, non-distended, normoactive bowel sounds,  no masses ,  EXTREMITIES:  no cyanosis,clubbing or edema  SKIN:  No rash/erythema/ecchymoses/petechiae/wounds/abscess/warm/dry  NEURO:  Alert, oriented    Vital Signs:  Vital Signs Last 24 Hrs  T(C): 36.6 (28 Sep 2018 10:06), Max: 36.8 (28 Sep 2018 09:54)  T(F): 97.9 (28 Sep 2018 10:06), Max: 98.2 (28 Sep 2018 09:54)  HR: 54 (28 Sep 2018 10:44) (54 - 97)  BP: 116/64 (28 Sep 2018 10:44) (91/57 - 116/64)  BP(mean): --  RR: 17 (28 Sep 2018 10:44) (14 - 18)  SpO2: 100% (28 Sep 2018 10:44) (98% - 100%)  Daily     Daily     LABS:                        11.4   5.4   )-----------( 254      ( 28 Sep 2018 10:26 )             35.4     09-28    139  |  102  |  12  ----------------------------<  148<H>  3.7   |  26  |  0.71    Ca    9.2      28 Sep 2018 10:26    TPro  7.1  /  Alb  4.1  /  TBili  1.8<H>  /  DBili  x   /  AST  545<H>  /  ALT  337<H>  /  AlkPhos  98  09-28    LIVER FUNCTIONS - ( 28 Sep 2018 10:26 )  Alb: 4.1 g/dL / Pro: 7.1 g/dL / ALK PHOS: 98 U/L / ALT: 337 U/L / AST: 545 U/L / GGT: x           PT/INR - ( 28 Sep 2018 10:26 )   PT: 11.3 sec;   INR: 1.04 ratio         PTT - ( 28 Sep 2018 10:26 )  PTT:27.7 sec    Amylase Serum--      Lipase [id number]       Ammonia--      Imaging:    < from: US Abdomen Complete (09.28.18 @ 11:42) >  FINDINGS:    Liver: Within normal limits.  Bile ducts: The common bile duct is mildly dilated and measures8 mm.  Gallbladder: Distended gallbladder containing numerous tiny mobile gallstones. No gallbladder wall thickening or pericholecystic fluid. Evaluation of sonographic Man sign is unreliable as he patient received pain medication.     Pancreas: Visualized portions are within normal limits.  Spleen: 9.9 cm. Within normal limits.  Right kidney: 10.3 cm. No hydronephrosis.   Left kidney: 9.2 cm.  No hydronephrosis.   Ascites: None.  Aorta and IVC: Visualized portions are within normal limits.    IMPRESSION:   Distended gallbladder containing numerous tiny mobile gallstones. Normal gallbladder wall thickness.  Man's sign was unreliable as the patient had received pain medication. Further evaluation with hepatobiliary scan is suggested to assess for cholecystitis.  Mild dilatation of the common bile duct.    < end of copied text >

## 2018-09-28 NOTE — ED PROVIDER NOTE - PROGRESS NOTE DETAILS
Dr. Laguna:  GI fellow at bedside. Dr. Laguna:  GI fellow has asked us to call Dr Witt as pt follows w him as OP. Call placed.

## 2018-09-28 NOTE — ED PROVIDER NOTE - OBJECTIVE STATEMENT
56 yo F with pmhx htn, diverticulitis, and scoliosis presenting with chest pain x yesterday. Patient states yesterday she began with back pain radiating to her substernal chest and epigastric area. Pain is intermittent and lasts hours. pain is 8/10 and worse with food. Patient admits to 5 episodes of vomiting without blood. Patient states last bm was yesterday and was normal. Patient denies edema, sob, cough, dypnea, fever, diarrhea, melena, brbpr, dysuria, hematuria, vaginal discharge, recent travel and tingling/numbness    FHX- MI 32 yo brother passed away  last cardiologist appointment five years ago

## 2018-09-28 NOTE — CONSULT NOTE ADULT - ATTENDING COMMENTS
Ole Scott MD, FACG, [facility]  [phone]

## 2018-09-28 NOTE — CONSULT NOTE ADULT - SUBJECTIVE AND OBJECTIVE BOX
GENERAL SURGERY CONSULT NOTE    Patient is a 57y old  Female who presents with a chief complaint of abdominal pain (28 Sep 2018 18:08)    HPI:  57 year old female with past medical history of hypertension, diverticulitis (2016), scoliosis, C diff () presenting with one day history of abdominal pain. Patient's pain is localized to the epigastrium started yesterday morning. The pain self-resolved and then came back in the evening. Pain is sharp, constant, 9/10 in severity, radiating to the back, worse with food, improved on morphine but not on advil. Patient also reports nausea with 5 episodes of nonbloody vomiting since yesterday. Patient also reports some chills but denies fevers. Patient states that this is not the first time she has had similar pain. Patient's last bowel movement was yesterday morning and normal. Patient denies weight loss/changes in stool color or calibur/hematochezia/recent travel/shortness of breath.    10-points review of system performed with pertinent negative and positive findings documented in the HPI     PAST MEDICAL & SURGICAL HISTORY:  Hypertension  Diverticulitis of intestine without perforation or abscess with bleeding, unspecified part of intestinal tract  Scoliosis  Scoliosis    FAMILY HISTORY:  Family history of early CAD (Sibling)  Family history of lung cancer  Family history of hypertension    SOCIAL HISTORY: no pe    MEDICATIONS  (STANDING):  enoxaparin Injectable 40 milliGRAM(s) SubCutaneous every 24 hours  sodium chloride 0.9%. 1000 milliLiter(s) (100 mL/Hr) IV Continuous <Continuous>    MEDICATIONS  (PRN):  morphine  - Injectable 2 milliGRAM(s) IV Push every 4 hours PRN Severe Pain (7 - 10)  ondansetron Injectable 4 milliGRAM(s) IV Push every 8 hours PRN Nausea and/or Vomiting    Allergies: aspirin (Rash)    Vital Signs Last 24 Hrs  T(C): 36.9 (28 Sep 2018 20:59), Max: 37 (28 Sep 2018 14:03)  T(F): 98.5 (28 Sep 2018 20:59), Max: 98.6 (28 Sep 2018 14:03)  HR: 60 (28 Sep 2018 20:59) (54 - 97)  BP: 128/77 (28 Sep 2018 20:59) (91/57 - 128/77)  BP(mean): --  RR: 16 (28 Sep 2018 20:59) (14 - 18)  SpO2: 99% (28 Sep 2018 20:59) (98% - 100%)  Daily     Daily     Exam:  General: resting in bed in no acute distress  HEENT: no sclera icterus   Respiratory: unlabored breathing on room air   Abd: abdomen soft, nondistended, nontender to palpation over the epigastrium or right upper quadrant (patient is medicated with morphine recently)                           11.4   5.4   )-----------( 254      ( 28 Sep 2018 10:26 )             35.4         139  |  102  |  12  ----------------------------<  148<H>  3.7   |  26  |  0.71    Ca    9.2      28 Sep 2018 10:26    TPro  7.1  /  Alb  4.1  /  TBili  1.8<H>  /  DBili  x   /  AST  545<H>  /  ALT  337<H>  /  AlkPhos  98      PT/INR - ( 28 Sep 2018 10:26 )   PT: 11.3 sec;   INR: 1.04 ratio         PTT - ( 28 Sep 2018 10:26 )  PTT:27.7 sec  Urinalysis Basic - ( 28 Sep 2018 22:06 )    Color: Yellow / Appearance: Slightly Turbid / S.033 / pH: x  Gluc: x / Ketone: Trace  / Bili: Small / Urobili: 2 mg/dL   Blood: x / Protein: Trace / Nitrite: Negative   Leuk Esterase: Negative / RBC: 7 /hpf / WBC 3 /hpf   Sq Epi: x / Non Sq Epi: 1 /hpf / Bacteria: Negative    IMAGING STUDIES:  EXAM:  US ABDOMEN COMPLETE                            PROCEDURE DATE:  2018            INTERPRETATION:  CLINICAL INFORMATION: Right upper quadrant pain.   Evaluate for acute cholecystitis. Patient had received pain medication.    COMPARISON: Sonogram of the abdomen 3/20/2009. Correlation is made with   prior abdominal CT dated 2014.    TECHNIQUE: Sonography of the abdomen.     FINDINGS:    Liver: Within normal limits.    Bile ducts: The common bile duct is mildly dilated and measures8 mm.    Gallbladder: Distended gallbladder containing numerous tiny mobile   gallstones. No gallbladder wall thickening or pericholecystic fluid.   Evaluation of sonographic Man sign is unreliable as the patient   received pain medication.       Pancreas: Visualized portions are within normal limits.    Spleen: 9.9 cm. Within normal limits.    Right kidney: 10.3 cm. No hydronephrosis.     Left kidney: 9.2 cm.  No hydronephrosis.     Ascites: None.    Aorta and IVC: Visualized portions are within normal limits.    IMPRESSION:     Distended gallbladder containing numerous tiny mobile gallstones. Normal   gallbladder wall thickness.  Man's sign was unreliable as the patient   had received pain medication. Further evaluation with hepatobiliary scan   is suggested to assess for cholecystitis.    Mild dilatation of the common bile duct.

## 2018-09-28 NOTE — H&P ADULT - NSHPREVIEWOFSYSTEMS_GEN_ALL_CORE
CONSTITUTIONAL: No weakness, fevers or chills  EYES/ENT: No visual changes;  No vertigo or throat pain   NECK: No pain or stiffness  RESPIRATORY: No cough, wheezing, hemoptysis; No shortness of breath  CARDIOVASCULAR: See HPI  GASTROINTESTINAL: See HPI   GENITOURINARY: No dysuria, frequency or hematuria  NEUROLOGICAL: No numbness or weakness  SKIN: No itching, rashes

## 2018-09-28 NOTE — H&P ADULT - NSHPLABSRESULTS_GEN_ALL_CORE
LABS:                        11.4   5.4   )-----------( 254      ( 28 Sep 2018 10:26 )             35.4     09-28    139  |  102  |  12  ----------------------------<  148<H>  3.7   |  26  |  0.71    Ca    9.2      28 Sep 2018 10:26    TPro  7.1  /  Alb  4.1  /  TBili  1.8<H>  /  DBili  x   /  AST  545<H>  /  ALT  337<H>  /  AlkPhos  98  09-28    PT/INR - ( 28 Sep 2018 10:26 )   PT: 11.3 sec;   INR: 1.04 ratio         PTT - ( 28 Sep 2018 10:26 )  PTT:27.7 sec      RADIOLOGY & ADDITIONAL TESTS: Reviewed.    < from: US Abdomen Complete (09.28.18 @ 11:42) >    IMPRESSION:     Distended gallbladder containing numerous tiny mobile gallstones. Normal   gallbladder wall thickness.  Man's sign was unreliable as the patient   had received pain medication. Further evaluation with hepatobiliary scan   is suggested to assess for cholecystitis.    Mild dilatation of the common bile duct.    < end of copied text >

## 2018-09-28 NOTE — H&P ADULT - NSCORESITESY/N_GEN_A_CORE_RD
No W Plasty Text: The lesion was extirpated to the level of the fat with a #15 scalpel blade.  Given the location of the defect, shape of the defect and the proximity to free margins a W-plasty was deemed most appropriate for repair.  Using a sterile surgical marker, the appropriate transposition arms of the W-plasty were drawn incorporating the defect and placing the expected incisions within the relaxed skin tension lines where possible.    The area thus outlined was incised deep to adipose tissue with a #15 scalpel blade.  The skin margins were undermined to an appropriate distance in all directions utilizing iris scissors.  The opposing transposition arms were then transposed into place in opposite direction and anchored with interrupted buried subcutaneous sutures.

## 2018-09-28 NOTE — ED ADULT NURSE NOTE - CHIEF COMPLAINT QUOTE
back pain rad epigastric pain x 1 day C/o N/v Vomit x5 yesterday. Pt afebrile C/o chills Denies fever. Resp even and nonlab. Pt has h/o HTN on meds. Denies burn or diff urinating. Last " normal BM yesterday." Denies rectal bleeding or change in color. Abd nondist.  RUQ tenderness, increased palpitations. Brother had MI,  at 43 yrs of age. Last time pt to cardiologist was x 5 years ago.

## 2018-09-28 NOTE — H&P ADULT - PROBLEM SELECTOR PLAN 1
- biliary pancreatitis vs choledocholithiasis vs acute cholecystitis vs cholangitis.   - lipase 175, TBili 1.8, , ALT  337. Patient with normal WBC count and afebrile. Nontoxic appearing   - ultrasound shows distended gallbladder and numerous tiny mobile gallstones. Normal gallbladder wall thickness. Mild dilatation of the common bile duct. CT scan pending. HIDA pending   - holding antibiotics in the setting of normal WBC, afebrile, nontoxic appearing. history of C diff  - followed by GI, who recommend EUS +/- ERCP   - c/w NS 100cc/hr, NPO. Morphine 2mg PRN q4hrs for severe pain - biliary pancreatitis vs choledocholithiasis vs acute cholecystitis vs acute cholangitis.   - lipase 175, TBili 1.8, , ALT  337. Patient with normal WBC count and afebrile. Nontoxic appearing   - ultrasound shows distended gallbladder and numerous tiny mobile gallstones. Normal gallbladder wall thickness. Mild dilatation of the common bile duct. CT scan pending. HIDA pending   - holding antibiotics in the setting of normal WBC, afebrile, nontoxic appearing. history of C diff  - followed by GI, who recommend EUS +/- ERCP   - c/w NS 100cc/hr, NPO. Morphine 2mg PRN q4hrs for severe pain

## 2018-09-28 NOTE — ED ADULT NURSE REASSESSMENT NOTE - NS ED NURSE REASSESS COMMENT FT1
pt returned from nuc med; c/o nausea; zofran given; ct notified of pt return; pt to start drinking for ct scan at 8:15; iv ns at 100 cc pr hour infusing

## 2018-09-28 NOTE — CONSULT NOTE ADULT - ATTENDING COMMENTS
I saw and examined the pt and discussed the tx plan with the House Staff. I agree with the exam and plan as documented in the above consult which I edited as indicated.  Pt comfortable, had pain medication several hours ago.  Abdomen with mild epigastric and RUQ tenderness.  LFTs elevated, US with mildly distended GB, CBD 8 mm.  Clinical picture c/w pt passing a stone.  Await GI input.   Need to trend LFTs and would recommend further imaging - ?MRCP.  Will need eventual lap callie.   Estee Whitt MD I saw and examined the pt and discussed the tx plan with the House Staff. I agree with the exam and plan as documented in the above consult which I edited as indicated.  Pt comfortable, had pain medication several hours ago.  Abdomen with mild epigastric and RUQ tenderness.  LFTs elevated, US with mildly distended GB, CBD 8 mm.  Clinical picture c/w pt passing a stone - cannot r/o acute cholecystitis at this time however - I recommend a HIDA.  Await GI input.   Need to trend LFTs. ?MRCP.   Will need eventual lap callie.   Estee Whitt MD I saw and examined the pt and discussed the tx plan with the House Staff. I agree with the exam and plan as documented in the above consult which I edited as indicated.  Pt comfortable, had pain medication several hours ago.  Abdomen with mild epigastric and RUQ tenderness.  LFTs elevated, US with mildly distended GB, CBD 8 mm.  Clinical picture c/w pt passing a stone - cannot r/o acute cholecystitis at this time however - I recommend a HIDA.  Await GI input.   Need to trend LFTs. Unfortunately the pt cannot have MRCP. If the LFTs remain elevated, she may need EUS.  Will need lap acllie at some point, timing TBD on the clinical progress and final dx.   Estee Whitt MD

## 2018-09-28 NOTE — CONSULT NOTE ADULT - SUBJECTIVE AND OBJECTIVE BOX
Patient is a 57y old  Female who presents with a chief complaint of Abdominal pain (28 Sep 2018 12:21)      HPI:  Patient is a 57 year old female with history of diverticulosis/diverticulitis, HTN, scoliosis with surgery, metallic tal, who reports yesterday she began to have chest pain  radiating to her back , right side abdominal discomfort with nausea and vomiting. She reports when she tried to eat thereafter she had the pain again .    She denies fever, chills, had a BM  this morning , denies any BRBPR or melena        PAST MEDICAL & SURGICAL HISTORY:  Hypertension  Diverticulitis of intestine without perforation or abscess with bleeding, unspecified part of intestinal tract  Scoliosis  Scoliosis      Allergies    aspirin (Rash)    Intolerances        MEDICATIONS  (STANDING):    MEDICATIONS  (PRN):          Review of Systems:    General:  No fevers had chills yesterday    CV:  chest discomfort ( radiating to back)    Resp:  No dyspnea, cough, tachypnea, wheezing  GI:  right side / RUQ discomfort , + nausea and vomiting., no bowel disturbance    :  No hematuria   Skin:  No  jaundice , rash      Vital Signs Last 24 Hrs  T(C): 37 (28 Sep 2018 14:03), Max: 37 (28 Sep 2018 14:03)  T(F): 98.6 (28 Sep 2018 14:03), Max: 98.6 (28 Sep 2018 14:03)  HR: 60 (28 Sep 2018 14:03) (54 - 97)  BP: 114/71 (28 Sep 2018 14:03) (91/57 - 116/64)  BP(mean): --  RR: 16 (28 Sep 2018 14:03) (14 - 18)  SpO2: 100% (28 Sep 2018 14:03) (98% - 100%)    PHYSICAL EXAM:    Constitutional: non toxic ,in NAD, well-developed  Neck:  supple  Respiratory: Anterior chest wall CTA  Cardiovascular: S1 and S2, RRR  Gastrointestinal: BS+, soft, No acute tenderness on exam ( took pain Rx) , non distended ,  Extremities: No peripheral edema  Neurological: A/O x 3, no focal deficits  Psychiatric: Normal mood, normal affect  Skin: No jaundice or visible rashes        LABS:                        11.4   5.4   )-----------( 254      ( 28 Sep 2018 10:26 )             35.4     09-28    139  |  102  |  12  ----------------------------<  148<H>  3.7   |  26  |  0.71    Ca    9.2      28 Sep 2018 10:26    TPro  7.1  /  Alb  4.1  /  TBili  1.8<H>  /  DBili  x   /  AST  545<H>  /  ALT  337<H>  /  AlkPhos  98  09-28    PT/INR - ( 28 Sep 2018 10:26 )   PT: 11.3 sec;   INR: 1.04 ratio         PTT - ( 28 Sep 2018 10:26 )  PTT:27.7 sec    LIVER FUNCTIONS - ( 28 Sep 2018 10:26 )  Alb: 4.1 g/dL / Pro: 7.1 g/dL / ALK PHOS: 98 U/L / ALT: 337 U/L / AST: 545 U/L / GGT: x             RADIOLOGY & ADDITIONAL TESTS:  < from: US Abdomen Complete (09.28.18 @ 11:42) >  EXAM:  US ABDOMEN COMPLETE                            PROCEDURE DATE:  09/28/2018          INTERPRETATION:  CLINICAL INFORMATION: Right upper quadrant pain.   Evaluate for acute cholecystitis. Patient had received pain medication.    COMPARISON: Sonogram of the abdomen 3/20/2009. Correlation is made with   prior abdominal CT dated 5/13/2014.    TECHNIQUE: Sonography of the abdomen.     FINDINGS:    Liver: Within normal limits.    Bile ducts: The common bile duct is mildly dilated and measures8 mm.    Gallbladder: Distended gallbladder containing numerous tiny mobile   gallstones. No gallbladder wall thickening or pericholecystic fluid.   Evaluation of sonographic Man sign is unreliable as the patient   received pain medication.       Pancreas: Visualized portions are within normal limits.    Spleen: 9.9 cm. Within normal limits.    Right kidney: 10.3 cm. No hydronephrosis.     Left kidney: 9.2 cm.  No hydronephrosis.     Ascites: None.    Aorta and IVC: Visualized portions are within normal limits.    IMPRESSION:     Distended gallbladder containing numerous tiny mobile gallstones. Normal   gallbladder wall thickness.  Man's sign was unreliable as the patient   had received pain medication. Further evaluation with hepatobiliary scan   is suggested to assess for cholecystitis.    Mild dilatation of the common bile duct.        < end of copied text >

## 2018-09-28 NOTE — H&P ADULT - NSHPPHYSICALEXAM_GEN_ALL_CORE
General: middle aged appearing female, well nourished in no acute distress   HEENT: NC/AT; PERRL, clear conjunctiva  Neck: supple, no lymphadenopathy   Respiratory: clear to ausculation bilaterally   Cardiovascular: RRR, S1, S2. No murmurs/rubs/gallops   Abdomen: soft, nondistended. No epigastric tenderness, Man's sign negative.   Extremities: WWP, 2+ peripheral pulses b/l; no LE edema  Skin: normal color and turgor; no rash  Neurological: A&Ox3. CN2-12.   Heme: no abnormal bruising

## 2018-09-28 NOTE — CONSULT NOTE ADULT - ASSESSMENT
56 yo female presented with epigastric and right upper quadrant pain. Patient is nontender on exam (though medicated) with no sonographic evidence of inflammation, given elevated LFT and T. bili and dilated CBD, likely a passed stone vs. choledocholithiasis     No surgical intervention indicated emergently  Trend LFT, GI consult for possible choledocholithiasis   Surgery will follow   Discussed with Dr. Dawkins 56 yo female presented with epigastric and right upper quadrant pain. Patient is nontender on exam (though medicated) with no sonographic evidence of inflammation, given elevated LFT and T. bili and dilated CBD, likely a passed stone vs. choledocholithiasis,     No surgical intervention indicated emergently  Trend LFT, GI consult for possible choledocholithiasis   Surgery will follow   Discussed with Dr. Dawkins

## 2018-09-28 NOTE — H&P ADULT - PROBLEM SELECTOR PLAN 2
- ultrasound shows distended gallbladder and numerous tiny mobile gallstones. Normal gallbladder wall thickness. Mild dilatation of the common bile duct. CT scan pending. HIDA pending   - f/u HIDA scan, CT scan  - surgery following who recommend elective lap callie

## 2018-09-29 ENCOUNTER — TRANSCRIPTION ENCOUNTER (OUTPATIENT)
Age: 58
End: 2018-09-29

## 2018-09-29 LAB
ALBUMIN SERPL ELPH-MCNC: 3.4 G/DL — SIGNIFICANT CHANGE UP (ref 3.3–5)
ALP SERPL-CCNC: 82 U/L — SIGNIFICANT CHANGE UP (ref 40–120)
ALT FLD-CCNC: 308 U/L — HIGH (ref 10–45)
ANION GAP SERPL CALC-SCNC: 10 MMOL/L — SIGNIFICANT CHANGE UP (ref 5–17)
APTT BLD: 28.6 SEC — SIGNIFICANT CHANGE UP (ref 27.5–37.4)
AST SERPL-CCNC: 194 U/L — HIGH (ref 10–40)
BASOPHILS # BLD AUTO: 0.01 K/UL — SIGNIFICANT CHANGE UP (ref 0–0.2)
BASOPHILS NFR BLD AUTO: 0.2 % — SIGNIFICANT CHANGE UP (ref 0–2)
BILIRUB DIRECT SERPL-MCNC: 0.2 MG/DL — SIGNIFICANT CHANGE UP (ref 0–0.2)
BILIRUB INDIRECT FLD-MCNC: 0.6 MG/DL — SIGNIFICANT CHANGE UP (ref 0.2–1)
BILIRUB SERPL-MCNC: 0.8 MG/DL — SIGNIFICANT CHANGE UP (ref 0.2–1.2)
BUN SERPL-MCNC: 4 MG/DL — LOW (ref 7–23)
CALCIUM SERPL-MCNC: 8.3 MG/DL — LOW (ref 8.4–10.5)
CHLORIDE SERPL-SCNC: 109 MMOL/L — HIGH (ref 96–108)
CO2 SERPL-SCNC: 22 MMOL/L — SIGNIFICANT CHANGE UP (ref 22–31)
CREAT SERPL-MCNC: 0.53 MG/DL — SIGNIFICANT CHANGE UP (ref 0.5–1.3)
CULTURE RESULTS: NO GROWTH — SIGNIFICANT CHANGE UP
EOSINOPHIL # BLD AUTO: 0.03 K/UL — SIGNIFICANT CHANGE UP (ref 0–0.5)
EOSINOPHIL NFR BLD AUTO: 0.6 % — SIGNIFICANT CHANGE UP (ref 0–6)
GLUCOSE SERPL-MCNC: 90 MG/DL — SIGNIFICANT CHANGE UP (ref 70–99)
HAV IGM SER-ACNC: SIGNIFICANT CHANGE UP
HBV CORE IGM SER-ACNC: SIGNIFICANT CHANGE UP
HBV SURFACE AG SER-ACNC: SIGNIFICANT CHANGE UP
HCT VFR BLD CALC: 31.1 % — LOW (ref 34.5–45)
HCV AB S/CO SERPL IA: 0.13 S/CO — SIGNIFICANT CHANGE UP
HCV AB SERPL-IMP: SIGNIFICANT CHANGE UP
HGB BLD-MCNC: 9.7 G/DL — LOW (ref 11.5–15.5)
IMM GRANULOCYTES NFR BLD AUTO: 0.6 % — SIGNIFICANT CHANGE UP (ref 0–1.5)
INR BLD: 1.03 RATIO — SIGNIFICANT CHANGE UP (ref 0.88–1.16)
LYMPHOCYTES # BLD AUTO: 1.28 K/UL — SIGNIFICANT CHANGE UP (ref 1–3.3)
LYMPHOCYTES # BLD AUTO: 25.7 % — SIGNIFICANT CHANGE UP (ref 13–44)
MAGNESIUM SERPL-MCNC: 2.2 MG/DL — SIGNIFICANT CHANGE UP (ref 1.6–2.6)
MCHC RBC-ENTMCNC: 21.6 PG — LOW (ref 27–34)
MCHC RBC-ENTMCNC: 31.2 GM/DL — LOW (ref 32–36)
MCV RBC AUTO: 69.1 FL — LOW (ref 80–100)
MONOCYTES # BLD AUTO: 0.36 K/UL — SIGNIFICANT CHANGE UP (ref 0–0.9)
MONOCYTES NFR BLD AUTO: 7.2 % — SIGNIFICANT CHANGE UP (ref 2–14)
NEUTROPHILS # BLD AUTO: 3.27 K/UL — SIGNIFICANT CHANGE UP (ref 1.8–7.4)
NEUTROPHILS NFR BLD AUTO: 65.7 % — SIGNIFICANT CHANGE UP (ref 43–77)
PHOSPHATE SERPL-MCNC: 1.9 MG/DL — LOW (ref 2.5–4.5)
PLATELET # BLD AUTO: 238 K/UL — SIGNIFICANT CHANGE UP (ref 150–400)
POTASSIUM SERPL-MCNC: 3.3 MMOL/L — LOW (ref 3.5–5.3)
POTASSIUM SERPL-SCNC: 3.3 MMOL/L — LOW (ref 3.5–5.3)
PROT SERPL-MCNC: 5.8 G/DL — LOW (ref 6–8.3)
PROTHROM AB SERPL-ACNC: 11.7 SEC — SIGNIFICANT CHANGE UP (ref 10–13.1)
RBC # BLD: 4.5 M/UL — SIGNIFICANT CHANGE UP (ref 3.8–5.2)
RBC # FLD: 15.6 % — HIGH (ref 10.3–14.5)
SODIUM SERPL-SCNC: 141 MMOL/L — SIGNIFICANT CHANGE UP (ref 135–145)
SPECIMEN SOURCE: SIGNIFICANT CHANGE UP
WBC # BLD: 4.98 K/UL — SIGNIFICANT CHANGE UP (ref 3.8–10.5)
WBC # FLD AUTO: 4.98 K/UL — SIGNIFICANT CHANGE UP (ref 3.8–10.5)

## 2018-09-29 PROCEDURE — 99233 SBSQ HOSP IP/OBS HIGH 50: CPT

## 2018-09-29 RX ORDER — URSODIOL 250 MG/1
1 TABLET, FILM COATED ORAL
Qty: 60 | Refills: 0 | OUTPATIENT
Start: 2018-09-29 | End: 2018-10-28

## 2018-09-29 RX ORDER — URSODIOL 250 MG/1
300 TABLET, FILM COATED ORAL EVERY 12 HOURS
Qty: 0 | Refills: 0 | Status: DISCONTINUED | OUTPATIENT
Start: 2018-09-29 | End: 2018-10-01

## 2018-09-29 RX ORDER — POTASSIUM CHLORIDE 20 MEQ
40 PACKET (EA) ORAL ONCE
Qty: 0 | Refills: 0 | Status: COMPLETED | OUTPATIENT
Start: 2018-09-29 | End: 2018-09-29

## 2018-09-29 RX ORDER — SODIUM,POTASSIUM PHOSPHATES 278-250MG
1 POWDER IN PACKET (EA) ORAL
Qty: 0 | Refills: 0 | Status: COMPLETED | OUTPATIENT
Start: 2018-09-29 | End: 2018-09-30

## 2018-09-29 RX ORDER — POTASSIUM CHLORIDE 20 MEQ
20 PACKET (EA) ORAL ONCE
Qty: 0 | Refills: 0 | Status: DISCONTINUED | OUTPATIENT
Start: 2018-09-29 | End: 2018-09-29

## 2018-09-29 RX ORDER — LOSARTAN POTASSIUM 100 MG/1
25 TABLET, FILM COATED ORAL DAILY
Qty: 0 | Refills: 0 | Status: DISCONTINUED | OUTPATIENT
Start: 2018-09-29 | End: 2018-10-01

## 2018-09-29 RX ORDER — POTASSIUM CHLORIDE 20 MEQ
20 PACKET (EA) ORAL
Qty: 0 | Refills: 0 | Status: DISCONTINUED | OUTPATIENT
Start: 2018-09-29 | End: 2018-09-29

## 2018-09-29 RX ORDER — POTASSIUM PHOSPHATE, MONOBASIC POTASSIUM PHOSPHATE, DIBASIC 236; 224 MG/ML; MG/ML
15 INJECTION, SOLUTION INTRAVENOUS ONCE
Qty: 0 | Refills: 0 | Status: DISCONTINUED | OUTPATIENT
Start: 2018-09-29 | End: 2018-09-29

## 2018-09-29 RX ADMIN — URSODIOL 300 MILLIGRAM(S): 250 TABLET, FILM COATED ORAL at 21:16

## 2018-09-29 RX ADMIN — Medication 50 MILLIEQUIVALENT(S): at 12:16

## 2018-09-29 RX ADMIN — ENOXAPARIN SODIUM 40 MILLIGRAM(S): 100 INJECTION SUBCUTANEOUS at 15:55

## 2018-09-29 RX ADMIN — Medication 40 MILLIEQUIVALENT(S): at 15:53

## 2018-09-29 RX ADMIN — Medication 1 TABLET(S): at 21:16

## 2018-09-29 RX ADMIN — SODIUM CHLORIDE 65 MILLILITER(S): 9 INJECTION INTRAMUSCULAR; INTRAVENOUS; SUBCUTANEOUS at 21:16

## 2018-09-29 RX ADMIN — Medication 1 TABLET(S): at 18:26

## 2018-09-29 RX ADMIN — SODIUM CHLORIDE 65 MILLILITER(S): 9 INJECTION INTRAMUSCULAR; INTRAVENOUS; SUBCUTANEOUS at 12:16

## 2018-09-29 NOTE — DISCHARGE NOTE ADULT - NS AS ACTIVITY OBS
not while taking narcotic pain medication/Do not make important decisions/Do not drive or operate machinery/No Heavy lifting/straining

## 2018-09-29 NOTE — DISCHARGE NOTE ADULT - OTHER SIGNIFICANT FINDINGS
< from: US Abdomen Complete (09.28.18 @ 11:42) >  IMPRESSION:     Distended gallbladder containing numerous tiny mobile gallstones. Normal   gallbladder wall thickness.  Man's sign was unreliable as the patient   had received pain medication. Further evaluation with hepatobiliary scan   is suggested to assess for cholecystitis.    Mild dilatation of the common bile duct.    < end of copied text >    < from: NM Hepatobiliary Scan w/wo Gall Bladder (09.28.18 @ 19:14) >  IMPRESSION:     1. No radionuclide evidence of acute cholecystitis.    2. Delayed visualization of small bowel, seen promptly after sincalide   infusion, probably due to prolonged fasting or morphine analgesia.    Suggest clinical correlation.       < end of copied text >    < from: CT Abdomen and Pelvis w/ Oral Cont and w/ IV Cont (09.28.18 @ 22:06) >    IMPRESSION: Cholelithiasis.  Diffuse gallbladder wall thickening, which   is nonspecific in etiology.  No pericholecystic inflammatory change to   indicate acute cholecystitis.  Minimally prominent common bile duct.  No   evidence of choledocholithiasis within limits of CT technique.  No   intrahepatic or ductal dilatation.    < end of copied text >

## 2018-09-29 NOTE — DISCHARGE NOTE ADULT - CARE PLAN
Principal Discharge DX:	Acute biliary pancreatitis without infection or necrosis  Goal:	Complete resolution  Assessment and plan of treatment:	You were found to have mild pancreatitis and transaminitis likely due a stone that blocked parts of your bile duct circulation. This was likely the cause of your symptoms. The second day of admission your lab work and symptoms resolved indicating that the stone likely passed on its own. Please follow-up with a surgeon outpatient for evaluation for a gallbladder removal surgery.  Secondary Diagnosis:	Essential hypertension  Goal:	Prevention of complications  Assessment and plan of treatment:	You have a history of high blood pressure which is linked with many serious health conditions if untreated. You are taking a combination of losartan and hydrochlorothiazide, please continue to take this medication and go to your primary care doctor to manage your high blood pressure. Principal Discharge DX:	Acute biliary pancreatitis without infection or necrosis  Goal:	return to daily living activity prior to surgery, postoperative recovery  Assessment and plan of treatment:	WOUND CARE: Leave steri strips in place until they come off on their own.  Please pat area dry.   BATHING: Please do not submerge wound underwater. You may shower and/or sponge bathe.  ACTIVITY: No heavy lifting anything more than 10-15lbs or straining. Otherwise, you may return to your usual level of physical activity. If you are taking narcotic pain medication (such as Percocet), do NOT drive a car, operate machinery or make important decisions.  DIET: Low fat diet  NOTIFY YOUR SURGEON IF: You have any bleeding that does not stop, any pus draining from your wound, any fever (over 100.4 F) or chills, persistent nausea/vomiting with inability to tolerate food or liquids, persistent diarrhea, or if your pain is not controlled on your discharge pain medications.  FOLLOW-UP:  1. Please call to make a follow-up appointment within 10 days of discharge with Dr. Mann (See below for office information to call for an appointment)   2. Please follow up with your primary care physician in one week regarding your hospitalization.  Secondary Diagnosis:	Essential hypertension  Goal:	Prevention of complications  Assessment and plan of treatment:	Continue losartan and hydrochlorothiazide, please continue to take this medication and go to your primary care doctor to manage your high blood pressure.

## 2018-09-29 NOTE — PROGRESS NOTE ADULT - ASSESSMENT
Patient is a 57 year old female with history of diverticulosis/diverticulitis, HTN, scoliosis with surgery, metallic tal, who reports yesterday she began to have chest pain  radiating to her back , right side abdominal discomfort with nausea and vomiting.   Elevated LFTs  - improving    MRI/MRCP contraindicated with metallic tal in back ( prior scoliosis surgery )   CT - gallstones, no intrahepatic or ductal dilation  HIDA - negative, tracer visualized in SB  Suspect passed gallstone    Recommendations:  trial PO clears, if tolerated then can advance  Surgery following, may need eventual cholecystectomy  trend LFTs  no role for ERCP at this time  Will start empiric Ursodiol    Discussed with patient and Housestaff    Cuauhtemoc Barkley PA-C    [facility]  [phone] Patient is a 57 year old female with history of diverticulosis/diverticulitis, HTN, scoliosis with surgery, metallic tal, who reports yesterday she began to have chest pain  radiating to her back , right side abdominal discomfort with nausea and vomiting.   Elevated LFTs  - improving    MRI/MRCP contraindicated with metallic tal in back ( prior scoliosis surgery )   CT - gallstones, no intrahepatic or ductal dilation  HIDA - negative, tracer visualized in SB  Suspect passed gallstone    Recommendations:  trial PO clears, if tolerated then can advance  Surgery following, may need eventual cholecystectomy  trend LFTs  no role for ERCP at this time  Will start empiric Ursodiol    Discussed with patient and Housestaff    Cuauhtemoc Barkley PA-C    [facility]  [phone]

## 2018-09-29 NOTE — DISCHARGE NOTE ADULT - PATIENT PORTAL LINK FT
You can access the [facility] Patient Portal, offered by [facility], by registering with the following website: [email or web address]

## 2018-09-29 NOTE — PROGRESS NOTE ADULT - ASSESSMENT
A: 56 yo female presented with epigastric and right upper quadrant pain. Patient is nontender on exam (though medicated) with no sonographic evidence of inflammation, given elevated LFT and T. bili and dilated CBD, likely a passed stone vs. choledocholithiasis    P:  - No surgical intervention indicated emergently  - Trend LFT, GI consult for possible choledocholithiasis   - If LFTs uptrending, consider EUS  - Surgery will follow, rec HIDA, may need lap callie at a later point in time    Misa Harry, PGY-1  General Surgery Green Team [phone]

## 2018-09-29 NOTE — PROGRESS NOTE ADULT - SUBJECTIVE AND OBJECTIVE BOX
INTERVAL HPI/OVERNIGHT EVENTS: patient continued to feel nauseous overnight and had one episode of nonbloody vomiting.     SUBJECTIVE: Patient seen and examined at bedside. Patient reports feeling much better with resolution of her nausea and abdominal pain. Patient had one bowel movement in the morning which was normal.     CONSTITUTIONAL: No weakness, fevers or chills  EYES/ENT: No visual changes;  No vertigo or throat pain   NECK: No pain or stiffness  RESPIRATORY: No cough, wheezing, hemoptysis; No shortness of breath  CARDIOVASCULAR: No chest pain or palpitations  GASTROINTESTINAL: No abdominal or epigastric pain. No nausea, vomiting, or hematemesis; No diarrhea or constipation. No melena or hematochezia.  GENITOURINARY: No dysuria, frequency or hematuria  NEUROLOGICAL: No numbness or weakness  SKIN: No itching, rashes    OBJECTIVE:    VITAL SIGNS:  ICU Vital Signs Last 24 Hrs  T(C): 37.2 (29 Sep 2018 07:52), Max: 37.2 (29 Sep 2018 07:52)  T(F): 98.9 (29 Sep 2018 07:52), Max: 98.9 (29 Sep 2018 07:52)  HR: 64 (29 Sep 2018 07:52) (54 - 69)  BP: 122/75 (29 Sep 2018 07:52) (114/71 - 128/77)  BP(mean): --  ABP: --  ABP(mean): --  RR: 18 (29 Sep 2018 07:52) (16 - 18)  SpO2: 96% (29 Sep 2018 07:52) (95% - 100%)        CAPILLARY BLOOD GLUCOSE      Physical Exam:     General: middle aged appearing female, well nourished in no acute distress   HEENT: NC/AT; PERRL, clear conjunctiva  Neck: supple, no lymphadenopathy   Respiratory: clear to ausculation bilaterally   Cardiovascular: RRR, S1, S2. No murmurs/rubs/gallops   Abdomen: soft, nondistended. No epigastric tenderness, Man's sign negative.   Extremities: WWP, 2+ peripheral pulses b/l; no LE edema  Skin: normal color and turgor; no rash  Neurological: A&Ox3    MEDICATIONS:  MEDICATIONS  (STANDING):  enoxaparin Injectable 40 milliGRAM(s) SubCutaneous every 24 hours  potassium chloride  20 mEq/100 mL IVPB 20 milliEquivalent(s) IV Intermittent every 2 hours  potassium phosphate IVPB 15 milliMole(s) IV Intermittent once  sodium chloride 0.9%. 1000 milliLiter(s) (65 mL/Hr) IV Continuous <Continuous>    MEDICATIONS  (PRN):  morphine  - Injectable 2 milliGRAM(s) IV Push every 4 hours PRN Severe Pain (7 - 10)  ondansetron Injectable 4 milliGRAM(s) IV Push every 8 hours PRN Nausea and/or Vomiting      ALLERGIES:  Allergies    aspirin (Rash)    Intolerances    LABS:                        9.7    4.98  )-----------( 238      ( 29 Sep 2018 08:17 )             31.1         141  |  109<H>  |  4<L>  ----------------------------<  90  3.3<L>   |  22  |  0.53    Ca    8.3<L>      29 Sep 2018 07:22  Phos  1.9       Mg     2.2         TPro  5.8<L>  /  Alb  3.4  /  TBili  0.8  /  DBili  0.2  /  AST  194<H>  /  ALT  308<H>  /  AlkPhos  82      PT/INR - ( 29 Sep 2018 08:23 )   PT: 11.7 sec;   INR: 1.03 ratio         PTT - ( 29 Sep 2018 08:23 )  PTT:28.6 sec  Urinalysis Basic - ( 28 Sep 2018 22:06 )    Color: Yellow / Appearance: Slightly Turbid / S.033 / pH: x  Gluc: x / Ketone: Trace  / Bili: Small / Urobili: 2 mg/dL   Blood: x / Protein: Trace / Nitrite: Negative   Leuk Esterase: Negative / RBC: 7 /hpf / WBC 3 /hpf   Sq Epi: x / Non Sq Epi: 1 /hpf / Bacteria: Negative        RADIOLOGY & ADDITIONAL TESTS: Reviewed.

## 2018-09-29 NOTE — DISCHARGE NOTE ADULT - HOSPITAL COURSE
57 year old female with past medical history of hypertension, diverticulitis (2016), scoliosis, C diff (2014) presenting with one day history of back pain radiating to the chest and epigastric area. Patient states yesterday morning she began with back pain radiating to her substernal chest. The pain self-resolved and then came back yesterday night after she had dinner and began radiating to her epigastrium. Pain is constant, 9/10 in severity, worse with food, improved on morphine but not on advil, patient was unable to describe the quality of the pain. Patient also reports nausea with 5 episodes of nonbloody vomiting since yesterday. Patient also reports some chills but denies fevers. Patient's last bowel movement was yesterday morning and normal. Patient denies weight loss/changes in stool color or calibur/hematochezia/recent travel/shortness of breath.     Abdominal ultrasound showed a distended gallbladder containing numerous tiny mobile gallstones with normal gallbladder wall thickness, and mild dilatation of the common bile duct. HIDA scan showed no radionuclide evidence of acute cholecystitis. And abdominal CT scan showed  cholelithiasis with diffuse gallbladder wall thickening, which is nonspecific in etiology. However, it showed NO evidence or pericholecystic inflammatory change to indicate acute cholecystitis or choledocholithiasis within limits of CT technique. Lastly, there was NO evidence or intrahepatic or ductal dilatation.    Patient's labwork, pain and nausea improved on the second day of admission and patient's diet was advanced. Surgery and GI were following the patient and believed patient had a passed stone. They did not recommend any acute interventions and said patient will need an elective laparoscopic cholecystectomy. Patient was discharged with ursodiol and instructed to follow-up with surgery outpatient. 57 year old female with past medical history of hypertension, diverticulitis (2016), scoliosis, C diff (2014) presenting with one day history of back pain radiating to the chest and epigastric area. Patient states yesterday morning she began with back pain radiating to her substernal chest. The pain self-resolved and then came back yesterday night after she had dinner and began radiating to her epigastrium. Pain is constant, 9/10 in severity, worse with food, improved on morphine but not on advil, patient was unable to describe the quality of the pain. Patient also reports nausea with 5 episodes of nonbloody vomiting since yesterday. Patient also reports some chills but denies fevers. Patient's last bowel movement was yesterday morning and normal. Patient denies weight loss/changes in stool color or calibur/hematochezia/recent travel/shortness of breath.     Abdominal ultrasound showed a distended gallbladder containing numerous tiny mobile gallstones with normal gallbladder wall thickness, and mild dilatation of the common bile duct. HIDA scan showed no radionuclide evidence of acute cholecystitis. And abdominal CT scan showed  cholelithiasis with diffuse gallbladder wall thickening, which is nonspecific in etiology. However, it showed NO evidence or pericholecystic inflammatory change to indicate acute cholecystitis or choledocholithiasis within limits of CT technique. Lastly, there was NO evidence or intrahepatic or ductal dilatation.    Patient's labwork, pain and nausea improved on the second day of admission and patient's diet was advanced. Surgery and GI were following the patient and believed patient had a passed stone. Patient decided to stay in the hospital to have cholecystectomy done.  The patient tolerated the procedure well (see operative report for full details). Postoperatively, diet was advanced as tolerated. Total bilirubin normalized.  Pain was controlled with an oral pain regimen.  On day of discharge, the patient was tolerating diet, ambulating well and pain controlled. She will follow up with Dr. Mann in 10 days.

## 2018-09-29 NOTE — DISCHARGE NOTE ADULT - PLAN OF CARE
Complete resolution You were found to have mild pancreatitis and transaminitis likely due a stone that blocked parts of your bile duct circulation. This was likely the cause of your symptoms. The second day of admission your lab work and symptoms resolved indicating that the stone likely passed on its own. Please follow-up with a surgeon outpatient for evaluation for a gallbladder removal surgery. Prevention of complications You have a history of high blood pressure which is linked with many serious health conditions if untreated. You are taking a combination of losartan and hydrochlorothiazide, please continue to take this medication and go to your primary care doctor to manage your high blood pressure. return to daily living activity prior to surgery, postoperative recovery WOUND CARE: Leave steri strips in place until they come off on their own.  Please pat area dry.   BATHING: Please do not submerge wound underwater. You may shower and/or sponge bathe.  ACTIVITY: No heavy lifting anything more than 10-15lbs or straining. Otherwise, you may return to your usual level of physical activity. If you are taking narcotic pain medication (such as Percocet), do NOT drive a car, operate machinery or make important decisions.  DIET: Low fat diet  NOTIFY YOUR SURGEON IF: You have any bleeding that does not stop, any pus draining from your wound, any fever (over 100.4 F) or chills, persistent nausea/vomiting with inability to tolerate food or liquids, persistent diarrhea, or if your pain is not controlled on your discharge pain medications.  FOLLOW-UP:  1. Please call to make a follow-up appointment within 10 days of discharge with Dr. Mann (See below for office information to call for an appointment)   2. Please follow up with your primary care physician in one week regarding your hospitalization. Continue losartan and hydrochlorothiazide, please continue to take this medication and go to your primary care doctor to manage your high blood pressure.

## 2018-09-29 NOTE — PROGRESS NOTE ADULT - PROBLEM SELECTOR PLAN 2
- no evidence of choledocholithiasis on scans   - f/u HIDA scan, CT scan  - surgery following who recommend elective lap callie

## 2018-09-29 NOTE — PROGRESS NOTE ADULT - ATTENDING COMMENTS
seen, examined with house staff  - Resting, afebrile, no N/V or abdominal pain. Tolerating clears, will advance diet tomorrow    Reviewed labs, imaging  - GI and Surgery plans noted    Electrolytes supplemented  - Losartan at low dose

## 2018-09-29 NOTE — DISCHARGE NOTE ADULT - CARE PROVIDER_API CALL
Estee Whitt), ColonRectal Surgery; Surgery  [facility]  [address] [place] [id number]  Phone: [phone]  Fax: [phone]

## 2018-09-29 NOTE — PROGRESS NOTE ADULT - PROBLEM SELECTOR PLAN 1
- biliary pancreatitis vs choledocholithiasis vs acute cholecystitis vs acute cholangitis.   - currently resolved. Surgery believes that patient may have passed the stone and recommended outpt follow-up  - initial lipase 175, TBili 1.8, , ALT  337. Repeat Tbili 0.6, , .   - Patient with normal WBC count and afebrile. Nontoxic appearing   - ultrasound shows distended gallbladder and numerous tiny mobile gallstones. Normal gallbladder wall thickness. Mild dilatation of the common bile duct. CT scan shows stones but no evidence of acute callie. HIDA shows no evidence of acute callie   - holding antibiotics in the setting of normal WBC, afebrile, nontoxic appearing. history of C diff  - c/w NS 65cc/hr, NPO. Morphine 2mg PRN q4hrs for severe pain

## 2018-09-29 NOTE — PROGRESS NOTE ADULT - ATTENDING COMMENTS
Ole Scott MD, FACG, [facility]  [phone] Surgery note reviewed.  Agree with EUS to r/o choledocholithiasis if not improving clinically or if LFTs starting rising.    Ole Scott MD, FACG, [facility]  [phone]

## 2018-09-29 NOTE — DISCHARGE NOTE ADULT - ADDITIONAL INSTRUCTIONS
Please follow-up with Dr. Estee Whitt in her office within 2 weeks to for evaluation for a laparoscopic cholecystectomy. Please follow-up with your primary care physician within 2 weeks.     If you have similar symptoms as you did in this admission again please return to the hospital immediately. If you have similar symptoms as you did in this admission again please return to the hospital immediately.

## 2018-09-29 NOTE — PROGRESS NOTE ADULT - SUBJECTIVE AND OBJECTIVE BOX
Patient is a 57y old  Female who presented with a chief complaint of abdominal pain (29 Sep 2018 10:41)      INTERVAL HPI/OVERNIGHT EVENTS:  no further abdominal pain  feels hungry  no fever, chills, nausea or vomiting    MEDICATIONS  (STANDING):  enoxaparin Injectable 40 milliGRAM(s) SubCutaneous every 24 hours  potassium chloride  20 mEq/100 mL IVPB 20 milliEquivalent(s) IV Intermittent every 2 hours  potassium phosphate IVPB 15 milliMole(s) IV Intermittent once  sodium chloride 0.9%. 1000 milliLiter(s) (65 mL/Hr) IV Continuous <Continuous>    MEDICATIONS  (PRN):  morphine  - Injectable 2 milliGRAM(s) IV Push every 4 hours PRN Severe Pain (7 - 10)  ondansetron Injectable 4 milliGRAM(s) IV Push every 8 hours PRN Nausea and/or Vomiting      Allergies  aspirin (Rash)    Review of Systems:  Gen: no fever or chills  CV: no CP or SOB  Resp: no dyspnea, cough  Ext: no swelling or pain  Skin: no rash or pruritis      Vital Signs Last 24 Hrs  T(C): 37.2 (29 Sep 2018 07:52), Max: 37.2 (29 Sep 2018 07:52)  T(F): 98.9 (29 Sep 2018 07:52), Max: 98.9 (29 Sep 2018 07:52)  HR: 64 (29 Sep 2018 07:52) (60 - 69)  BP: 122/75 (29 Sep 2018 07:52) (114/71 - 128/77)  BP(mean): --  RR: 18 (29 Sep 2018 07:52) (16 - 18)  SpO2: 96% (29 Sep 2018 07:52) (95% - 100%)    PHYSICAL EXAM:  Constitutional: non toxic ,in NAD, well-developed  Neck:  supple  Respiratory: clear b/l  Cardiovascular: S1 and S2, RRR  Gastrointestinal: BS+, soft, non tender, non distended , no rebound guarding or rigidity  Extremities: No peripheral edema  Neurological: A/O x 3, no focal deficits  Psychiatric: Normal mood, normal affect  Skin: No jaundice or visible rashes    LABS:                        9.7    4.98  )-----------( 238      ( 29 Sep 2018 08:17 )             31.1     09-29    141  |  109<H>  |  4<L>  ----------------------------<  90  3.3<L>   |  22  |  0.53    Ca    8.3<L>      29 Sep 2018 07:22  Phos  1.9     09-29  Mg     2.2     09-29    TPro  5.8<L>  /  Alb  3.4  /  TBili  0.8  /  DBili  0.2  /  AST  194<H>  /  ALT  308<H>  /  AlkPhos  82  09-29    Aspartate Aminotransferase (AST/SGOT): 545 U/L (09.28.18 @ 10:26)    Alanine Aminotransferase (ALT/SGPT): 337 U/L (09.28.18 @ 10:26)        PT/INR - ( 29 Sep 2018 08:23 )   PT: 11.7 sec;   INR: 1.03 ratio    PTT - ( 29 Sep 2018 08:23 )  PTT:28.6 sec      RADIOLOGY & ADDITIONAL TESTS:  < from: NM Hepatobiliary Scan w/wo Gall Bladder (09.28.18 @ 19:14) >  FINDINGS: There is prompt, homogeneous uptake of radiopharmaceutical by   the hepatocytes. Activity is first seen in the gallbladder at about 25   minutes. There was no visualization of bowel at 2 hrs and 6hrs, but   prompt visualization following sincalide infusion. There is good   clearance of activity from the liver by the end of the study.    IMPRESSION:     1. No radionuclide evidence of acute cholecystitis.    2. Delayed visualization of small bowel, seen promptly after sincalide   infusion, probably due to prolonged fasting or morphine analgesia.    Suggest clinical correlation.       < from: CT Abdomen and Pelvis w/ Oral Cont and w/ IV Cont (09.28.18 @ 22:06) >  FINDINGS:    LOWER CHEST: Within normal limits.    LIVER: 7 mm hypodense focus in segment 7 (3:16) is too small to   characterize by CT scan.  BILE DUCTS: The suprapancreatic common bile duct measures 7-8 mm and   tapers to 6 mm at the pancreatic head..  GALLBLADDER: Cholelithiasis.  Diffuse gallbladder wall thickening.  SPLEEN: Within normal limits.  PANCREAS: Within normal limits.  ADRENALS: Within normal limits.  KIDNEYS/URETERS: There is excretion of IV contrast into the renal   collecting systems.  A 4 mm hypodense focus in the upper pole the right   kidney is too small to characterize by CT scan.    BLADDER: Within normal limits.  REPRODUCTIVE ORGANS: No uterine or adnexal mass.    BOWEL: No bowel obstruction.  Normal appendix.  PERITONEUM: No ascites.  VESSELS: Minimal atherosclerotic calcification of the aortoiliac tree.  RETROPERITONEUM: No lymphadenopathy.    ABDOMINAL WALL: Within normal limits.  BONES: Thoracolumbar scoliosis with partially visualized right-sided   Garvey tal in place.    IMPRESSION: Cholelithiasis.  Diffuse gallbladder wall thickening, which   is nonspecific in etiology.  No pericholecystic inflammatory change to   indicate acute cholecystitis.  Minimally prominent common bile duct.  No   evidence of choledocholithiasis within limits of CT technique.  No   intrahepatic or ductal dilatation.

## 2018-09-29 NOTE — PROGRESS NOTE ADULT - SUBJECTIVE AND OBJECTIVE BOX
General Surgery Progress Note    SUBJECTIVE:  The patient was seen and examined. No acute events overnight.     OBJECTIVE:     ** VITAL SIGNS / I&O's **    Vital Signs Last 24 Hrs  T(C): 36.8 (28 Sep 2018 23:41), Max: 37 (28 Sep 2018 14:03)  T(F): 98.2 (28 Sep 2018 23:41), Max: 98.6 (28 Sep 2018 14:03)  HR: 69 (28 Sep 2018 23:41) (54 - 97)  BP: 126/74 (28 Sep 2018 23:41) (91/57 - 128/77)  BP(mean): --  RR: 16 (28 Sep 2018 23:41) (14 - 18)  SpO2: 95% (28 Sep 2018 23:41) (95% - 100%)        ** PHYSICAL EXAM **    -- CONSTITUTIONAL: Alert, NAD  -- PULMONARY: non-labored respirations  -- ABDOMEN: soft, non-distended, mildly tender over epigastrium/RUQ  -- NEURO: A&Ox3    ** LABS **                          11.4   5.4   )-----------( 254      ( 28 Sep 2018 10:26 )             35.4     28 Sep 2018 10:26    139    |  102    |  12     ----------------------------<  148    3.7     |  26     |  0.71     Ca    9.2        28 Sep 2018 10:26    TPro  7.1    /  Alb  4.1    /  TBili  1.8    /  DBili  x      /  AST  545    /  ALT  337    /  AlkPhos  98     28 Sep 2018 10:26    PT/INR - ( 28 Sep 2018 10:26 )   PT: 11.3 sec;   INR: 1.04 ratio         PTT - ( 28 Sep 2018 10:26 )  PTT:27.7 sec  CAPILLARY BLOOD GLUCOSE            LIVER FUNCTIONS - ( 28 Sep 2018 10:26 )  Alb: 4.1 g/dL / Pro: 7.1 g/dL / ALK PHOS: 98 U/L / ALT: 337 U/L / AST: 545 U/L / GGT: x             Urinalysis Basic - ( 28 Sep 2018 22:06 )    Color: Yellow / Appearance: Slightly Turbid / S.033 / pH: x  Gluc: x / Ketone: Trace  / Bili: Small / Urobili: 2 mg/dL   Blood: x / Protein: Trace / Nitrite: Negative   Leuk Esterase: Negative / RBC: 7 /hpf / WBC 3 /hpf   Sq Epi: x / Non Sq Epi: 1 /hpf / Bacteria: Negative        MEDICATIONS  (STANDING):  enoxaparin Injectable 40 milliGRAM(s) SubCutaneous every 24 hours  sodium chloride 0.9%. 1000 milliLiter(s) (100 mL/Hr) IV Continuous <Continuous>    MEDICATIONS  (PRN):  morphine  - Injectable 2 milliGRAM(s) IV Push every 4 hours PRN Severe Pain (7 - 10)  ondansetron Injectable 4 milliGRAM(s) IV Push every 8 hours PRN Nausea and/or Vomiting

## 2018-09-29 NOTE — DISCHARGE NOTE ADULT - MEDICATION SUMMARY - MEDICATIONS TO TAKE
I will START or STAY ON the medications listed below when I get home from the hospital:    losartan-hydrochlorothiazide 50mg-12.5mg oral tablet  -- 1 tab(s) by mouth once a day  -- Indication: For High blood pressure    ursodiol 300 mg oral capsule  -- 1 cap(s) by mouth every 12 hours  -- Indication: For Helps dissolve gallstones I will START or STAY ON the medications listed below when I get home from the hospital:    acetaminophen 325 mg oral tablet  -- 2 tab(s) by mouth every 6 hours  -- Indication: For Pain    oxyCODONE 5 mg oral tablet  -- 1 tab(s) by mouth every 6 hours, As needed, Moderate Pain (4 - 6) MDD:4  -- Indication: For Pain    losartan-hydrochlorothiazide 50mg-12.5mg oral tablet  -- 1 tab(s) by mouth once a day  -- Indication: For High blood pressure

## 2018-09-30 LAB
ALBUMIN SERPL ELPH-MCNC: 4.2 G/DL — SIGNIFICANT CHANGE UP (ref 3.3–5)
ALP SERPL-CCNC: 82 U/L — SIGNIFICANT CHANGE UP (ref 40–120)
ALT FLD-CCNC: 228 U/L — HIGH (ref 10–45)
ANION GAP SERPL CALC-SCNC: 9 MMOL/L — SIGNIFICANT CHANGE UP (ref 5–17)
APPEARANCE UR: CLEAR — SIGNIFICANT CHANGE UP
APTT BLD: 31.2 SEC — SIGNIFICANT CHANGE UP (ref 27.5–37.4)
AST SERPL-CCNC: 81 U/L — HIGH (ref 10–40)
BILIRUB DIRECT SERPL-MCNC: 0.2 MG/DL — SIGNIFICANT CHANGE UP (ref 0–0.2)
BILIRUB INDIRECT FLD-MCNC: 0.6 MG/DL — SIGNIFICANT CHANGE UP (ref 0.2–1)
BILIRUB SERPL-MCNC: 0.8 MG/DL — SIGNIFICANT CHANGE UP (ref 0.2–1.2)
BILIRUB UR-MCNC: NEGATIVE — SIGNIFICANT CHANGE UP
BLD GP AB SCN SERPL QL: NEGATIVE — SIGNIFICANT CHANGE UP
BUN SERPL-MCNC: 4 MG/DL — LOW (ref 7–23)
CALCIUM SERPL-MCNC: 9.1 MG/DL — SIGNIFICANT CHANGE UP (ref 8.4–10.5)
CHLORIDE SERPL-SCNC: 106 MMOL/L — SIGNIFICANT CHANGE UP (ref 96–108)
CO2 SERPL-SCNC: 26 MMOL/L — SIGNIFICANT CHANGE UP (ref 22–31)
COLOR SPEC: YELLOW — SIGNIFICANT CHANGE UP
CREAT SERPL-MCNC: 0.57 MG/DL — SIGNIFICANT CHANGE UP (ref 0.5–1.3)
DIFF PNL FLD: NEGATIVE — SIGNIFICANT CHANGE UP
GLUCOSE SERPL-MCNC: 87 MG/DL — SIGNIFICANT CHANGE UP (ref 70–99)
GLUCOSE UR QL: NEGATIVE MG/DL — SIGNIFICANT CHANGE UP
HCT VFR BLD CALC: 34.1 % — LOW (ref 34.5–45)
HGB BLD-MCNC: 10.8 G/DL — LOW (ref 11.5–15.5)
INR BLD: 0.98 RATIO — SIGNIFICANT CHANGE UP (ref 0.88–1.16)
KETONES UR-MCNC: NEGATIVE — SIGNIFICANT CHANGE UP
LEUKOCYTE ESTERASE UR-ACNC: NEGATIVE — SIGNIFICANT CHANGE UP
MAGNESIUM SERPL-MCNC: 2.3 MG/DL — SIGNIFICANT CHANGE UP (ref 1.6–2.6)
MCHC RBC-ENTMCNC: 21.8 PG — LOW (ref 27–34)
MCHC RBC-ENTMCNC: 31.7 GM/DL — LOW (ref 32–36)
MCV RBC AUTO: 68.8 FL — LOW (ref 80–100)
NITRITE UR-MCNC: NEGATIVE — SIGNIFICANT CHANGE UP
PH UR: 6.5 — SIGNIFICANT CHANGE UP (ref 5–8)
PHOSPHATE SERPL-MCNC: 2.6 MG/DL — SIGNIFICANT CHANGE UP (ref 2.5–4.5)
PLATELET # BLD AUTO: 238 K/UL — SIGNIFICANT CHANGE UP (ref 150–400)
POTASSIUM SERPL-MCNC: 3.7 MMOL/L — SIGNIFICANT CHANGE UP (ref 3.5–5.3)
POTASSIUM SERPL-SCNC: 3.7 MMOL/L — SIGNIFICANT CHANGE UP (ref 3.5–5.3)
PROT SERPL-MCNC: 7 G/DL — SIGNIFICANT CHANGE UP (ref 6–8.3)
PROT UR-MCNC: NEGATIVE MG/DL — SIGNIFICANT CHANGE UP
PROTHROM AB SERPL-ACNC: 11.1 SEC — SIGNIFICANT CHANGE UP (ref 10–13.1)
RBC # BLD: 4.96 M/UL — SIGNIFICANT CHANGE UP (ref 3.8–5.2)
RBC # FLD: 15.9 % — HIGH (ref 10.3–14.5)
RH IG SCN BLD-IMP: POSITIVE — SIGNIFICANT CHANGE UP
SODIUM SERPL-SCNC: 141 MMOL/L — SIGNIFICANT CHANGE UP (ref 135–145)
SP GR SPEC: 1.01 — LOW (ref 1.01–1.02)
UROBILINOGEN FLD QL: 1 MG/DL — SIGNIFICANT CHANGE UP
WBC # BLD: 3.84 K/UL — SIGNIFICANT CHANGE UP (ref 3.8–10.5)
WBC # FLD AUTO: 3.84 K/UL — SIGNIFICANT CHANGE UP (ref 3.8–10.5)

## 2018-09-30 PROCEDURE — 99233 SBSQ HOSP IP/OBS HIGH 50: CPT | Mod: GC

## 2018-09-30 RX ORDER — IBUPROFEN 200 MG
400 TABLET ORAL ONCE
Qty: 0 | Refills: 0 | Status: COMPLETED | OUTPATIENT
Start: 2018-09-30 | End: 2018-09-30

## 2018-09-30 RX ADMIN — LOSARTAN POTASSIUM 25 MILLIGRAM(S): 100 TABLET, FILM COATED ORAL at 06:26

## 2018-09-30 RX ADMIN — Medication 400 MILLIGRAM(S): at 14:00

## 2018-09-30 RX ADMIN — Medication 400 MILLIGRAM(S): at 21:10

## 2018-09-30 RX ADMIN — SODIUM CHLORIDE 65 MILLILITER(S): 9 INJECTION INTRAMUSCULAR; INTRAVENOUS; SUBCUTANEOUS at 20:34

## 2018-09-30 RX ADMIN — URSODIOL 300 MILLIGRAM(S): 250 TABLET, FILM COATED ORAL at 20:34

## 2018-09-30 RX ADMIN — ENOXAPARIN SODIUM 40 MILLIGRAM(S): 100 INJECTION SUBCUTANEOUS at 15:54

## 2018-09-30 RX ADMIN — Medication 1 TABLET(S): at 12:29

## 2018-09-30 RX ADMIN — SODIUM CHLORIDE 65 MILLILITER(S): 9 INJECTION INTRAMUSCULAR; INTRAVENOUS; SUBCUTANEOUS at 09:17

## 2018-09-30 RX ADMIN — Medication 400 MILLIGRAM(S): at 13:07

## 2018-09-30 RX ADMIN — Medication 1 TABLET(S): at 09:17

## 2018-09-30 RX ADMIN — Medication 400 MILLIGRAM(S): at 22:10

## 2018-09-30 RX ADMIN — URSODIOL 300 MILLIGRAM(S): 250 TABLET, FILM COATED ORAL at 10:31

## 2018-09-30 NOTE — PROGRESS NOTE ADULT - PROBLEM SELECTOR PLAN 5
DVT PPx - lovenox 40mg QD   Diet - NPO  Dispo - discharge today DVT PPx - lovenox 40mg QD   Diet - clears, NPO at midnight  Dispo - pending OR tomorrow

## 2018-09-30 NOTE — PROGRESS NOTE ADULT - ASSESSMENT
A: 58 yo female presented with epigastric and right upper quadrant pain. Patient is nontender on exam (though medicated) with no sonographic evidence of inflammation, given elevated LFT and T. bili and dilated CBD, likely a passed stone vs. choledocholithiasis    P:  - Provided the option of either leaving and following up with Dr. Mojica for a lap callie at a later time or staying over the weekend to be placed as an add-on for MON. Pt chose the latter option.   - Consent is done, pre-op labs ordered  - NPO at midnight  - Add on on Monday 10/1 with Dr. Mojica for lap callie, poss open, with intraoperative cholangiogram  - Trend LFT    Misa Hrary, PGY-1  General Surgery Green [facility] [phone]

## 2018-09-30 NOTE — PROGRESS NOTE ADULT - PROBLEM SELECTOR PLAN 1
- biliary pancreatitis vs choledocholithiasis vs acute cholecystitis vs acute cholangitis.   - currently resolved. Surgery believes that patient may have passed the stone and recommended outpt follow-up  - initial lipase 175, TBili 1.8, , ALT  337. Repeat Tbili 0.6, , .   - Patient with normal WBC count and afebrile. Nontoxic appearing   - ultrasound shows distended gallbladder and numerous tiny mobile gallstones. Normal gallbladder wall thickness. Mild dilatation of the common bile duct. CT scan shows stones but no evidence of acute callie. HIDA shows no evidence of acute callie   - holding antibiotics in the setting of normal WBC, afebrile, nontoxic appearing. history of C diff  - c/w NS 65cc/hr, NPO. Morphine 2mg PRN q4hrs for severe pain - biliary pancreatitis vs choledocholithiasis vs acute cholecystitis vs acute cholangitis.   - currently resolved. Surgery believes that patient may have passed the stone   - initial lipase 175, TBili 1.8, , ALT  337. Repeat Tbili 0.6, , .   - Patient with normal WBC count and afebrile. Nontoxic appearing   - ultrasound shows distended gallbladder and numerous tiny mobile gallstones. Normal gallbladder wall thickness. Mild dilatation of the common bile duct. CT scan shows stones but no evidence of acute callie. HIDA shows no evidence of acute callie   - holding antibiotics in the setting of normal WBC, afebrile, nontoxic appearing. history of C diff  - c/w NS 65cc/hr, NPO. Morphine 2mg PRN q4hrs for severe pain  - Surgery planning for add-on cholecystectomy tomorrow, 10/1, NPO at midnight

## 2018-09-30 NOTE — PROGRESS NOTE ADULT - SUBJECTIVE AND OBJECTIVE BOX
General Surgery Progress Note    SUBJECTIVE:  The patient was seen and examined. No acute events overnight. No further abd pain. Feels hungry. Denies N/V, +flatus/+BM.     OBJECTIVE:     ** VITAL SIGNS / I&O's **    Vital Signs Last 24 Hrs  T(C): 36.6 (30 Sep 2018 06:25), Max: 37.2 (29 Sep 2018 07:52)  T(F): 97.9 (30 Sep 2018 06:25), Max: 98.9 (29 Sep 2018 07:52)  HR: 66 (30 Sep 2018 06:25) (61 - 68)  BP: 149/84 (30 Sep 2018 06:25) (121/74 - 149/84)  BP(mean): --  RR: 18 (30 Sep 2018 06:25) (18 - 18)  SpO2: 99% (30 Sep 2018 06:25) (94% - 99%)      29 Sep 2018 07:01  -  30 Sep 2018 06:46  --------------------------------------------------------  IN:    Oral Fluid: 690 mL  Total IN: 690 mL    OUT:  Total OUT: 0 mL    Total NET: 690 mL          ** PHYSICAL EXAM **    -- CONSTITUTIONAL: Alert, NAD  -- PULMONARY: non-labored respirations  -- ABDOMEN: soft, non-distended, non-tender  -- NEURO: A&Ox3    ** LABS **                          9.7    4.98  )-----------( 238      ( 29 Sep 2018 08:17 )             31.1     29 Sep 2018 07:22    141    |  109    |  4      ----------------------------<  90     3.3     |  22     |  0.53     Ca    8.3        29 Sep 2018 07:22  Phos  1.9       29 Sep 2018 07:22  Mg     2.2       29 Sep 2018 07:22    TPro  5.8    /  Alb  3.4    /  TBili  0.8    /  DBili  0.2    /  AST  194    /  ALT  308    /  AlkPhos  82     29 Sep 2018 07:22    PT/INR - ( 29 Sep 2018 08:23 )   PT: 11.7 sec;   INR: 1.03 ratio         PTT - ( 29 Sep 2018 08:23 )  PTT:28.6 sec  CAPILLARY BLOOD GLUCOSE            LIVER FUNCTIONS - ( 29 Sep 2018 07:22 )  Alb: 3.4 g/dL / Pro: 5.8 g/dL / ALK PHOS: 82 U/L / ALT: 308 U/L / AST: 194 U/L / GGT: x             Culture - Urine (collected 29 Sep 2018 00:57)  Source: .Urine Clean Catch (Midstream)  Final Report (29 Sep 2018 23:11):    No growth    Culture - Blood (collected 28 Sep 2018 17:20)  Source: .Blood Blood-Venous  Preliminary Report (29 Sep 2018 18:01):    No growth to date.    Culture - Blood (collected 28 Sep 2018 17:20)  Source: .Blood Blood-Venous  Preliminary Report (29 Sep 2018 18:01):    No growth to date.      Urinalysis Basic - ( 28 Sep 2018 22:06 )    Color: Yellow / Appearance: Slightly Turbid / S.033 / pH: x  Gluc: x / Ketone: Trace  / Bili: Small / Urobili: 2 mg/dL   Blood: x / Protein: Trace / Nitrite: Negative   Leuk Esterase: Negative / RBC: 7 /hpf / WBC 3 /hpf   Sq Epi: x / Non Sq Epi: 1 /hpf / Bacteria: Negative        MEDICATIONS  (STANDING):  enoxaparin Injectable 40 milliGRAM(s) SubCutaneous every 24 hours  losartan 25 milliGRAM(s) Oral daily  potassium acid phosphate/sodium acid phosphate tablet (K-PHOS No. 2) 1 Tablet(s) Oral four times a day with meals  sodium chloride 0.9%. 1000 milliLiter(s) (65 mL/Hr) IV Continuous <Continuous>  ursodiol Capsule 300 milliGRAM(s) Oral every 12 hours    MEDICATIONS  (PRN):  morphine  - Injectable 2 milliGRAM(s) IV Push every 4 hours PRN Severe Pain (7 - 10)  ondansetron Injectable 4 milliGRAM(s) IV Push every 8 hours PRN Nausea and/or Vomiting

## 2018-09-30 NOTE — PROGRESS NOTE ADULT - ASSESSMENT
Patient is a 57 year old female with history of diverticulosis/diverticulitis, HTN, scoliosis with surgery and placement of a metallic tal in the back, admitted with elevated LFTs, chest pain radiating to her back , right side abdominal discomfort with nausea and vomiting.   Elevated LFTs now improving    MRI/MRCP contraindicated with metallic tal in back ( prior scoliosis surgery )   CT - gallstones, no intrahepatic or ductal dilation  Abdominal ultrasound with CBD at 8mm and gallstones.  HIDA - negative for acute cholecystitis, tracer visualized in SB  Suspect passed CBD stone as clinically improving and LFTs decreasing.    Recommendations:  Possible cholecystectomy tomorrow pending surgery schedule.  Follow LFTs  Continue ursodiol.  No plan for EUS or ERCP at this time, however if clinically worsening, or has rising LFTs, then will need EUS to r/o choledocholithiasis. Patient is a 57 year old female with history of diverticulosis/diverticulitis, HTN, scoliosis with surgery and placement of a metallic tal in the back, admitted with elevated LFTs, chest pain radiating to her back , right side abdominal discomfort with nausea and vomiting.   Elevated LFTs now improving, acute hepatitis panel negative.    MRI/MRCP contraindicated with metallic tal in back ( prior scoliosis surgery )   CT - gallstones, no intrahepatic or ductal dilation  Abdominal ultrasound with CBD at 8mm and gallstones.  HIDA - negative for acute cholecystitis, tracer visualized in SB  Suspect passed CBD stone as clinically improving and LFTs decreasing.    Recommendations:  Possible cholecystectomy tomorrow pending surgery schedule.  Follow LFTs  Continue ursodiol.  No plan for EUS or ERCP at this time, however if clinically worsening, or has rising LFTs, then will need EUS to r/o choledocholithiasis.

## 2018-09-30 NOTE — PROGRESS NOTE ADULT - PROBLEM SELECTOR PLAN 2
- no evidence of choledocholithiasis on scans   - f/u HIDA scan, CT scan  - surgery following who recommend elective lap callie - no evidence of choledocholithiasis on scans   - HIDA and CT as above  - surgery following - add on for tomorrow

## 2018-09-30 NOTE — PROGRESS NOTE ADULT - SUBJECTIVE AND OBJECTIVE BOX
Patient is a 57y old  Female who presents with a chief complaint of abdominal pain (30 Sep 2018 06:46)      SUBJECTIVE / OVERNIGHT EVENTS:    MEDICATIONS  (STANDING):  enoxaparin Injectable 40 milliGRAM(s) SubCutaneous every 24 hours  losartan 25 milliGRAM(s) Oral daily  potassium acid phosphate/sodium acid phosphate tablet (K-PHOS No. 2) 1 Tablet(s) Oral four times a day with meals  sodium chloride 0.9%. 1000 milliLiter(s) (65 mL/Hr) IV Continuous <Continuous>  ursodiol Capsule 300 milliGRAM(s) Oral every 12 hours    MEDICATIONS  (PRN):  morphine  - Injectable 2 milliGRAM(s) IV Push every 4 hours PRN Severe Pain (7 - 10)  ondansetron Injectable 4 milliGRAM(s) IV Push every 8 hours PRN Nausea and/or Vomiting        CAPILLARY BLOOD GLUCOSE        I&O's Summary    29 Sep 2018 07:01  -  30 Sep 2018 06:54  --------------------------------------------------------  IN: 690 mL / OUT: 0 mL / NET: 690 mL        PHYSICAL EXAM:  Vital Signs Last 24 Hrs  T(C): 36.6 (30 Sep 2018 06:25), Max: 37.2 (29 Sep 2018 07:52)  T(F): 97.9 (30 Sep 2018 06:25), Max: 98.9 (29 Sep 2018 07:52)  HR: 66 (30 Sep 2018 06:25) (61 - 68)  BP: 149/84 (30 Sep 2018 06:25) (121/74 - 149/84)  BP(mean): --  RR: 18 (30 Sep 2018 06:25) (18 - 18)  SpO2: 99% (30 Sep 2018 06:25) (94% - 99%)    GENERAL: NAD, well-developed  HEAD:  Atraumatic, Normocephalic  EYES: EOMI, PERRLA, conjunctiva and sclera clear  NECK: Supple, No JVD  CHEST/LUNG: Clear to auscultation bilaterally; No wheeze  HEART: Regular rate and rhythm; No murmurs, rubs, or gallops  ABDOMEN: Soft, Nontender, Nondistended; Bowel sounds present  EXTREMITIES:  2+ Peripheral Pulses, No clubbing, cyanosis, or edema  PSYCH: AAOx3  NEUROLOGY: non-focal  SKIN: No rashes or lesions    LABS:  ( @ 08:17)                        9.7  4.98 )-----------( 238                 31.1    Neutrophils = 3.27 (65.7%)  Lymphocytes = 1.28 (25.7%)  Eosinophils = 0.03 (0.6%)  Basophils = 0.01 (0.2%)  Monocytes = 0.36 (7.2%)  Bands = --%    WBC Trend: 4.98<--, 5.4<--  Hb Trend: 9.7<--, 11.4<--  Plt Trend: 238<--, 254<--      141  |  109<H>  |  4<L>  ----------------------------<  90  3.3<L>   |  22  |  0.53    Ca    8.3<L>      29 Sep 2018 07:22  Phos  1.9       Mg     2.2         TPro  5.8<L>  /  Alb  3.4  /  TBili  0.8  /  DBili  0.2  /  AST  194<H>  /  ALT  308<H>  /  AlkPhos  82      Creatinine Trend: 0.53<--, 0.71<--  ( 29 Sep 2018 08:23 )   PT: 11.7 sec;   INR: 1.03 ratio;       PTT:28.6 sec      Urinalysis Basic - ( 28 Sep 2018 22:06 )    Color: Yellow / Appearance: Slightly Turbid / S.033 / pH: x  Gluc: x / Ketone: Trace  / Bili: Small / Urobili: 2 mg/dL   Blood: x / Protein: Trace / Nitrite: Negative   Leuk Esterase: Negative / RBC: 7 /hpf / WBC 3 /hpf   Sq Epi: x / Non Sq Epi: 1 /hpf / Bacteria: Negative        Microbiology:  Urine Cx:  Blood Cx:    RADIOLOGY & ADDITIONAL TESTS:  X- Ray:  CT:  Ultrasound:  [ ] imaging personally reviewed and interpreted by me    Consultant(s) Notes Reviewed:      Care Discussed with Consultants/Other Providers: Patient is a 57y old  Female who presents with a chief complaint of abdominal pain (30 Sep 2018 06:46)      SUBJECTIVE / OVERNIGHT EVENTS: no acute events overnight. patient seen this morning, denies any abdominal pain, n/V. Ate clears for breakfast without any problems.     MEDICATIONS  (STANDING):  enoxaparin Injectable 40 milliGRAM(s) SubCutaneous every 24 hours  losartan 25 milliGRAM(s) Oral daily  potassium acid phosphate/sodium acid phosphate tablet (K-PHOS No. 2) 1 Tablet(s) Oral four times a day with meals  sodium chloride 0.9%. 1000 milliLiter(s) (65 mL/Hr) IV Continuous <Continuous>  ursodiol Capsule 300 milliGRAM(s) Oral every 12 hours    MEDICATIONS  (PRN):  morphine  - Injectable 2 milliGRAM(s) IV Push every 4 hours PRN Severe Pain (7 - 10)  ondansetron Injectable 4 milliGRAM(s) IV Push every 8 hours PRN Nausea and/or Vomiting        CAPILLARY BLOOD GLUCOSE        I&O's Summary    29 Sep 2018 07:01  -  30 Sep 2018 06:54  --------------------------------------------------------  IN: 690 mL / OUT: 0 mL / NET: 690 mL        PHYSICAL EXAM:  Vital Signs Last 24 Hrs  T(C): 36.6 (30 Sep 2018 06:25), Max: 37.2 (29 Sep 2018 07:52)  T(F): 97.9 (30 Sep 2018 06:25), Max: 98.9 (29 Sep 2018 07:52)  HR: 66 (30 Sep 2018 06:25) (61 - 68)  BP: 149/84 (30 Sep 2018 06:25) (121/74 - 149/84)  BP(mean): --  RR: 18 (30 Sep 2018 06:25) (18 - 18)  SpO2: 99% (30 Sep 2018 06:25) (94% - 99%)    GENERAL: NAD, well-developed  HEAD:  Atraumatic, Normocephalic  EYES: EOMI, PERRLA, conjunctiva and sclera clear  NECK: Supple, No JVD  CHEST/LUNG: Clear to auscultation bilaterally; No wheeze  HEART: Regular rate and rhythm; No murmurs, rubs, or gallops  ABDOMEN: Soft, Nontender, Nondistended; Bowel sounds present  EXTREMITIES:  2+ Peripheral Pulses, No clubbing, cyanosis, or edema  PSYCH: AAOx3  NEUROLOGY: non-focal  SKIN: No rashes or lesions    LABS:  ( @ 08:17)                        9.7  4.98 )-----------( 238                 31.1    Neutrophils = 3.27 (65.7%)  Lymphocytes = 1.28 (25.7%)  Eosinophils = 0.03 (0.6%)  Basophils = 0.01 (0.2%)  Monocytes = 0.36 (7.2%)  Bands = --%    WBC Trend: 4.98<--, 5.4<--  Hb Trend: 9.7<--, 11.4<--  Plt Trend: 238<--, 254<--      141  |  109<H>  |  4<L>  ----------------------------<  90  3.3<L>   |  22  |  0.53    Ca    8.3<L>      29 Sep 2018 07:22  Phos  1.9       Mg     2.2         TPro  5.8<L>  /  Alb  3.4  /  TBili  0.8  /  DBili  0.2  /  AST  194<H>  /  ALT  308<H>  /  AlkPhos  82      Creatinine Trend: 0.53<--, 0.71<--  ( 29 Sep 2018 08:23 )   PT: 11.7 sec;   INR: 1.03 ratio;       PTT:28.6 sec      Urinalysis Basic - ( 28 Sep 2018 22:06 )    Color: Yellow / Appearance: Slightly Turbid / S.033 / pH: x  Gluc: x / Ketone: Trace  / Bili: Small / Urobili: 2 mg/dL   Blood: x / Protein: Trace / Nitrite: Negative   Leuk Esterase: Negative / RBC: 7 /hpf / WBC 3 /hpf   Sq Epi: x / Non Sq Epi: 1 /hpf / Bacteria: Negative        Microbiology:  Urine Cx:  Blood Cx:    RADIOLOGY & ADDITIONAL TESTS:  X- Ray:  CT:  Ultrasound:  [ ] imaging personally reviewed and interpreted by me    Consultant(s) Notes Reviewed:  GI, surgery    Care Discussed with Consultants/Other Providers:

## 2018-09-30 NOTE — PROGRESS NOTE ADULT - SUBJECTIVE AND OBJECTIVE BOX
Patient is a 57y old  Female who presented with a chief complaint of abdominal pain (29 Sep 2018 10:41)      INTERVAL HPI/OVERNIGHT EVENTS:  Tolerated diet.  Feels better.    MEDICATIONS  (STANDING):  enoxaparin Injectable 40 milliGRAM(s) SubCutaneous every 24 hours  potassium chloride  20 mEq/100 mL IVPB 20 milliEquivalent(s) IV Intermittent every 2 hours  potassium phosphate IVPB 15 milliMole(s) IV Intermittent once  sodium chloride 0.9%. 1000 milliLiter(s) (65 mL/Hr) IV Continuous <Continuous>    MEDICATIONS  (PRN):  morphine  - Injectable 2 milliGRAM(s) IV Push every 4 hours PRN Severe Pain (7 - 10)  ondansetron Injectable 4 milliGRAM(s) IV Push every 8 hours PRN Nausea and/or Vomiting      Allergies  aspirin (Rash)    Review of Systems:  Gen: no fever or chills  CV: no CP or SOB  Resp: no dyspnea, cough  Ext: no swelling or pain  Skin: no rash or pruritis      Vital Signs Last 24 Hrs  T(F): 98.3 (18 @ 10:01), Max: 98.8 (18 @ 16:17)  HR: 63 (18 @ 10:01) (61 - 68)  BP: 112/70 (18 @ 10:01) (112/70 - 149/84)  RR: 18 (18 @ 10:01) (18 - 18)  SpO2: 98% (18 @ 10:01) (94% - 99%)  Wt(kg): --  ,   I&O's Summary    29 Sep 2018 07:01  -  30 Sep 2018 07:00  --------------------------------------------------------  IN: 690 mL / OUT: 0 mL / NET: 690 mL          PHYSICAL EXAM:  Constitutional: non toxic ,in NAD, well-developed  Neck:  supple  Respiratory: clear b/l  Cardiovascular: S1 and S2, RRR  Gastrointestinal: BS+, soft, non tender, non distended , no rebound guarding or rigidity  Extremities: No peripheral edema  Neurological: A/O x 3, no focal deficits  Psychiatric: Normal mood, normal affect  Skin: No jaundice or visible rashes    LABS:                                   10.8   3.84  )-----------( 238      ( 30 Sep 2018 08:11 )             34.1                   141  |  106  |  4<L>  ----------------------------<  87  3.7   |  26  |  0.57    Ca    9.1      30 Sep 2018 07:18  Phos  2.6       Mg     2.3         TPro  7.0  /  Alb  4.2  /  TBili  0.8  /  DBili  0.2  /  AST  81<H>  /  ALT  228<H>  /  AlkPhos  82      PT/INR - ( 30 Sep 2018 08:09 )   PT: 11.1 sec;   INR: 0.98 ratio         PTT - ( 30 Sep 2018 08:09 )  PTT:31.2 sec                   Urinalysis Basic - ( 28 Sep 2018 22:06 )    Color: Yellow / Appearance: Slightly Turbid / S.033 / pH: x  Gluc: x / Ketone: Trace  / Bili: Small / Urobili: 2 mg/dL   Blood: x / Protein: Trace / Nitrite: Negative   Leuk Esterase: Negative / RBC: 7 /hpf / WBC 3 /hpf   Sq Epi: x / Non Sq Epi: 1 /hpf / Bacteria: Negative            RADIOLOGY & ADDITIONAL TESTS:  < from: NM Hepatobiliary Scan w/wo Gall Bladder (18 @ 19:14) >  FINDINGS: There is prompt, homogeneous uptake of radiopharmaceutical by   the hepatocytes. Activity is first seen in the gallbladder at about 25   minutes. There was no visualization of bowel at 2 hrs and 6hrs, but   prompt visualization following sincalide infusion. There is good   clearance of activity from the liver by the end of the study.    IMPRESSION:     1. No radionuclide evidence of acute cholecystitis.    2. Delayed visualization of small bowel, seen promptly after sincalide   infusion, probably due to prolonged fasting or morphine analgesia.    Suggest clinical correlation.       < from: CT Abdomen and Pelvis w/ Oral Cont and w/ IV Cont (18 @ 22:06) >  FINDINGS:    LOWER CHEST: Within normal limits.    LIVER: 7 mm hypodense focus in segment 7 (3:16) is too small to   characterize by CT scan.  BILE DUCTS: The suprapancreatic common bile duct measures 7-8 mm and   tapers to 6 mm at the pancreatic head..  GALLBLADDER: Cholelithiasis.  Diffuse gallbladder wall thickening.  SPLEEN: Within normal limits.  PANCREAS: Within normal limits.  ADRENALS: Within normal limits.  KIDNEYS/URETERS: There is excretion of IV contrast into the renal   collecting systems.  A 4 mm hypodense focus in the upper pole the right   kidney is too small to characterize by CT scan.    BLADDER: Within normal limits.  REPRODUCTIVE ORGANS: No uterine or adnexal mass.    BOWEL: No bowel obstruction.  Normal appendix.  PERITONEUM: No ascites.  VESSELS: Minimal atherosclerotic calcification of the aortoiliac tree.  RETROPERITONEUM: No lymphadenopathy.    ABDOMINAL WALL: Within normal limits.  BONES: Thoracolumbar scoliosis with partially visualized right-sided   Garvey tal in place.    IMPRESSION: Cholelithiasis.  Diffuse gallbladder wall thickening, which   is nonspecific in etiology.  No pericholecystic inflammatory change to   indicate acute cholecystitis.  Minimally prominent common bile duct.  No   evidence of choledocholithiasis within limits of CT technique.  No   intrahepatic or ductal dilatation.

## 2018-10-01 ENCOUNTER — TRANSCRIPTION ENCOUNTER (OUTPATIENT)
Age: 58
End: 2018-10-01

## 2018-10-01 PROBLEM — I10 ESSENTIAL (PRIMARY) HYPERTENSION: Chronic | Status: ACTIVE | Noted: 2018-09-28

## 2018-10-01 LAB
ALBUMIN SERPL ELPH-MCNC: 3.9 G/DL — SIGNIFICANT CHANGE UP (ref 3.3–5)
ALP SERPL-CCNC: 77 U/L — SIGNIFICANT CHANGE UP (ref 40–120)
ALT FLD-CCNC: 162 U/L — HIGH (ref 10–45)
ANION GAP SERPL CALC-SCNC: 10 MMOL/L — SIGNIFICANT CHANGE UP (ref 5–17)
APTT BLD: 33.1 SEC — SIGNIFICANT CHANGE UP (ref 27.5–37.4)
AST SERPL-CCNC: 37 U/L — SIGNIFICANT CHANGE UP (ref 10–40)
BASOPHILS # BLD AUTO: 0.01 K/UL — SIGNIFICANT CHANGE UP (ref 0–0.2)
BASOPHILS # BLD AUTO: 0.02 K/UL — SIGNIFICANT CHANGE UP (ref 0–0.2)
BASOPHILS NFR BLD AUTO: 0.3 % — SIGNIFICANT CHANGE UP (ref 0–2)
BASOPHILS NFR BLD AUTO: 0.5 % — SIGNIFICANT CHANGE UP (ref 0–2)
BILIRUB DIRECT SERPL-MCNC: 0.2 MG/DL — SIGNIFICANT CHANGE UP (ref 0–0.2)
BILIRUB INDIRECT FLD-MCNC: 0.6 MG/DL — SIGNIFICANT CHANGE UP (ref 0.2–1)
BILIRUB SERPL-MCNC: 0.8 MG/DL — SIGNIFICANT CHANGE UP (ref 0.2–1.2)
BUN SERPL-MCNC: <4 MG/DL — LOW (ref 7–23)
CALCIUM SERPL-MCNC: 8.8 MG/DL — SIGNIFICANT CHANGE UP (ref 8.4–10.5)
CHLORIDE SERPL-SCNC: 107 MMOL/L — SIGNIFICANT CHANGE UP (ref 96–108)
CO2 SERPL-SCNC: 25 MMOL/L — SIGNIFICANT CHANGE UP (ref 22–31)
CREAT SERPL-MCNC: 0.57 MG/DL — SIGNIFICANT CHANGE UP (ref 0.5–1.3)
EOSINOPHIL # BLD AUTO: 0.09 K/UL — SIGNIFICANT CHANGE UP (ref 0–0.5)
EOSINOPHIL # BLD AUTO: 0.1 K/UL — SIGNIFICANT CHANGE UP (ref 0–0.5)
EOSINOPHIL NFR BLD AUTO: 2.3 % — SIGNIFICANT CHANGE UP (ref 0–6)
EOSINOPHIL NFR BLD AUTO: 3 % — SIGNIFICANT CHANGE UP (ref 0–6)
GLUCOSE SERPL-MCNC: 84 MG/DL — SIGNIFICANT CHANGE UP (ref 70–99)
HCT VFR BLD CALC: 34.4 % — LOW (ref 34.5–45)
HGB BLD-MCNC: 10.9 G/DL — LOW (ref 11.5–15.5)
IMM GRANULOCYTES NFR BLD AUTO: 0.6 % — SIGNIFICANT CHANGE UP (ref 0–1.5)
IMM GRANULOCYTES NFR BLD AUTO: 0.8 % — SIGNIFICANT CHANGE UP (ref 0–1.5)
INR BLD: 1.03 RATIO — SIGNIFICANT CHANGE UP (ref 0.88–1.16)
LYMPHOCYTES # BLD AUTO: 1.16 K/UL — SIGNIFICANT CHANGE UP (ref 1–3.3)
LYMPHOCYTES # BLD AUTO: 1.47 K/UL — SIGNIFICANT CHANGE UP (ref 1–3.3)
LYMPHOCYTES # BLD AUTO: 35.4 % — SIGNIFICANT CHANGE UP (ref 13–44)
LYMPHOCYTES # BLD AUTO: 38.3 % — SIGNIFICANT CHANGE UP (ref 13–44)
MAGNESIUM SERPL-MCNC: 2.4 MG/DL — SIGNIFICANT CHANGE UP (ref 1.6–2.6)
MCHC RBC-ENTMCNC: 21.6 PG — LOW (ref 27–34)
MCHC RBC-ENTMCNC: 31.7 GM/DL — LOW (ref 32–36)
MCV RBC AUTO: 68.3 FL — LOW (ref 80–100)
MONOCYTES # BLD AUTO: 0.27 K/UL — SIGNIFICANT CHANGE UP (ref 0–0.9)
MONOCYTES # BLD AUTO: 0.31 K/UL — SIGNIFICANT CHANGE UP (ref 0–0.9)
MONOCYTES NFR BLD AUTO: 7 % — SIGNIFICANT CHANGE UP (ref 2–14)
MONOCYTES NFR BLD AUTO: 9.5 % — SIGNIFICANT CHANGE UP (ref 2–14)
NEUTROPHILS # BLD AUTO: 1.68 K/UL — LOW (ref 1.8–7.4)
NEUTROPHILS # BLD AUTO: 1.96 K/UL — SIGNIFICANT CHANGE UP (ref 1.8–7.4)
NEUTROPHILS NFR BLD AUTO: 51.1 % — SIGNIFICANT CHANGE UP (ref 43–77)
NEUTROPHILS NFR BLD AUTO: 51.2 % — SIGNIFICANT CHANGE UP (ref 43–77)
PHOSPHATE SERPL-MCNC: 3 MG/DL — SIGNIFICANT CHANGE UP (ref 2.5–4.5)
PLATELET # BLD AUTO: 225 K/UL — SIGNIFICANT CHANGE UP (ref 150–400)
POTASSIUM SERPL-MCNC: 3.5 MMOL/L — SIGNIFICANT CHANGE UP (ref 3.5–5.3)
POTASSIUM SERPL-SCNC: 3.5 MMOL/L — SIGNIFICANT CHANGE UP (ref 3.5–5.3)
PROT SERPL-MCNC: 6.6 G/DL — SIGNIFICANT CHANGE UP (ref 6–8.3)
PROTHROM AB SERPL-ACNC: 11.7 SEC — SIGNIFICANT CHANGE UP (ref 10–13.1)
RBC # BLD: 5.04 M/UL — SIGNIFICANT CHANGE UP (ref 3.8–5.2)
RBC # FLD: 16 % — HIGH (ref 10.3–14.5)
SODIUM SERPL-SCNC: 142 MMOL/L — SIGNIFICANT CHANGE UP (ref 135–145)
WBC # BLD: 3.28 K/UL — LOW (ref 3.8–10.5)
WBC # FLD AUTO: 3.28 K/UL — LOW (ref 3.8–10.5)

## 2018-10-01 PROCEDURE — 99232 SBSQ HOSP IP/OBS MODERATE 35: CPT | Mod: GC

## 2018-10-01 PROCEDURE — 99233 SBSQ HOSP IP/OBS HIGH 50: CPT

## 2018-10-01 RX ORDER — SODIUM CHLORIDE 9 MG/ML
1000 INJECTION INTRAMUSCULAR; INTRAVENOUS; SUBCUTANEOUS
Qty: 0 | Refills: 0 | Status: DISCONTINUED | OUTPATIENT
Start: 2018-10-01 | End: 2018-10-01

## 2018-10-01 RX ORDER — ONDANSETRON 8 MG/1
4 TABLET, FILM COATED ORAL EVERY 8 HOURS
Qty: 0 | Refills: 0 | Status: DISCONTINUED | OUTPATIENT
Start: 2018-10-01 | End: 2018-10-02

## 2018-10-01 RX ORDER — LOSARTAN POTASSIUM 100 MG/1
25 TABLET, FILM COATED ORAL DAILY
Qty: 0 | Refills: 0 | Status: DISCONTINUED | OUTPATIENT
Start: 2018-10-01 | End: 2018-10-02

## 2018-10-01 RX ORDER — PANTOPRAZOLE SODIUM 20 MG/1
40 TABLET, DELAYED RELEASE ORAL DAILY
Qty: 0 | Refills: 0 | Status: DISCONTINUED | OUTPATIENT
Start: 2018-10-01 | End: 2018-10-02

## 2018-10-01 RX ORDER — MORPHINE SULFATE 50 MG/1
2 CAPSULE, EXTENDED RELEASE ORAL EVERY 4 HOURS
Qty: 0 | Refills: 0 | Status: DISCONTINUED | OUTPATIENT
Start: 2018-10-01 | End: 2018-10-02

## 2018-10-01 RX ADMIN — LOSARTAN POTASSIUM 25 MILLIGRAM(S): 100 TABLET, FILM COATED ORAL at 05:28

## 2018-10-01 NOTE — PROGRESS NOTE ADULT - ASSESSMENT
Impression:  1. Likely passed bile duct stone, patient now asymptomatic, LFT's downtrending. Bili/ALP WNL.    Recommendation:  -surgery planning for lap callie and IOC today    Everett Gallardo M.D.   [facility]  Contact: [phone]

## 2018-10-01 NOTE — PROGRESS NOTE ADULT - ASSESSMENT
57yoF with PMH with hypertension, diverticulitis (2016), scoliosis, C diff (2014) presenting with one day history of back pain radiating to the chest and epigastric area. Patient found to have elevated lipase and gallstones with mild duct dilation. Likely passed CBD stone.

## 2018-10-01 NOTE — PROGRESS NOTE ADULT - ATTENDING COMMENTS
I saw and examined the pt and discussed the tx plan with the House Staff. I agree with the exam and plan as documented in the surgery resident's note from today.  Pt with clinical picture c/w passing a stone, with LFTs down-trending.  Pain has resolved.  Discussed lap callie, poss open, with IOC - discussed details, R/B/A of the procedure and expectations of recovery. Discussed limitations of IOC and possibility of not being able to perform if it would jeopardize closure of the cystic duct. Will need to continue to f/u her LFTs postop. All questions answered.    Estee Whitt MD

## 2018-10-01 NOTE — PROGRESS NOTE ADULT - SUBJECTIVE AND OBJECTIVE BOX
General Surgery Progress Note    SUBJECTIVE:  The patient was seen and examined. No acute events overnight. Denies N/V, +flatus/+BM. OOB/amb. Denies abd pain.     OBJECTIVE:     ** VITAL SIGNS / I&O's **    Vital Signs Last 24 Hrs  T(C): 37.7 (30 Sep 2018 20:00), Max: 37.7 (30 Sep 2018 20:00)  T(F): 99.9 (30 Sep 2018 20:00), Max: 99.9 (30 Sep 2018 20:00)  HR: 86 (30 Sep 2018 20:00) (60 - 86)  BP: 116/67 (30 Sep 2018 20:00) (112/70 - 149/84)  BP(mean): --  RR: 18 (30 Sep 2018 20:00) (18 - 18)  SpO2: 95% (30 Sep 2018 20:00) (95% - 99%)      29 Sep 2018 07:01  -  30 Sep 2018 07:00  --------------------------------------------------------  IN:    Oral Fluid: 690 mL  Total IN: 690 mL    OUT:  Total OUT: 0 mL    Total NET: 690 mL      30 Sep 2018 07:01  -  01 Oct 2018 03:37  --------------------------------------------------------  IN:    Oral Fluid: 380 mL  Total IN: 380 mL    OUT:  Total OUT: 0 mL    Total NET: 380 mL          ** PHYSICAL EXAM **    -- CONSTITUTIONAL: Alert, NAD  -- PULMONARY: non-labored respirations  -- ABDOMEN: soft, non-distended, non-tender  -- NEURO: A&Ox3    ** LABS **                          10.8   3.84  )-----------( 238      ( 30 Sep 2018 08:11 )             34.1     30 Sep 2018 07:18    141    |  106    |  4      ----------------------------<  87     3.7     |  26     |  0.57     Ca    9.1        30 Sep 2018 07:18  Phos  2.6       30 Sep 2018 07:18  Mg     2.3       30 Sep 2018 07:18    TPro  7.0    /  Alb  4.2    /  TBili  0.8    /  DBili  0.2    /  AST  81     /  ALT  228    /  AlkPhos  82     30 Sep 2018 07:18    PT/INR - ( 30 Sep 2018 08:09 )   PT: 11.1 sec;   INR: 0.98 ratio         PTT - ( 30 Sep 2018 08:09 )  PTT:31.2 sec  CAPILLARY BLOOD GLUCOSE            LIVER FUNCTIONS - ( 30 Sep 2018 07:18 )  Alb: 4.2 g/dL / Pro: 7.0 g/dL / ALK PHOS: 82 U/L / ALT: 228 U/L / AST: 81 U/L / GGT: x             Culture - Urine (collected 29 Sep 2018 00:57)  Source: .Urine Clean Catch (Midstream)  Final Report (29 Sep 2018 23:11):    No growth    Culture - Blood (collected 28 Sep 2018 17:20)  Source: .Blood Blood-Venous  Preliminary Report (29 Sep 2018 18:01):    No growth to date.    Culture - Blood (collected 28 Sep 2018 17:20)  Source: .Blood Blood-Venous  Preliminary Report (29 Sep 2018 18:01):    No growth to date.      Urinalysis Basic - ( 30 Sep 2018 16:57 )    Color: Yellow / Appearance: Clear / S.006 / pH: x  Gluc: x / Ketone: Negative  / Bili: Negative / Urobili: 1.0 mg/dL   Blood: x / Protein: Negative mg/dL / Nitrite: Negative   Leuk Esterase: Negative / RBC: x / WBC x   Sq Epi: x / Non Sq Epi: x / Bacteria: x        MEDICATIONS  (STANDING):  enoxaparin Injectable 40 milliGRAM(s) SubCutaneous every 24 hours  losartan 25 milliGRAM(s) Oral daily  sodium chloride 0.9%. 1000 milliLiter(s) (65 mL/Hr) IV Continuous <Continuous>  ursodiol Capsule 300 milliGRAM(s) Oral every 12 hours    MEDICATIONS  (PRN):  morphine  - Injectable 2 milliGRAM(s) IV Push every 4 hours PRN Severe Pain (7 - 10)  ondansetron Injectable 4 milliGRAM(s) IV Push every 8 hours PRN Nausea and/or Vomiting

## 2018-10-01 NOTE — PROGRESS NOTE ADULT - ATTENDING COMMENTS
Seen, examined the patient  - afebrile, no more abdominal pain  - Reviewed labs, imaging    Cam back up from OR as the Lap callie is cancelled for an emergency case    stated clears now, NPO p MN for Lap callie tomorrow  - c/w current meds- PPI, zofran prn and Losartan  - Surgery, GI f/u noted

## 2018-10-01 NOTE — PROGRESS NOTE ADULT - SUBJECTIVE AND OBJECTIVE BOX
Patient is a 57y old  Female who presented with a chief complaint of abdominal pain (01 Oct 2018 09:33)    INTERVAL HPI/OVERNIGHT EVENTS:  no abdominal pain  awaiting OR today for callie with planned IOC    MEDICATIONS  (STANDING):  losartan 25 milliGRAM(s) Oral daily  pantoprazole  Injectable 40 milliGRAM(s) IV Push daily  sodium chloride 0.9%. 1000 milliLiter(s) (65 mL/Hr) IV Continuous <Continuous>    MEDICATIONS  (PRN):  morphine  - Injectable 2 milliGRAM(s) IV Push every 4 hours PRN Severe Pain (7 - 10)  ondansetron Injectable 4 milliGRAM(s) IV Push every 8 hours PRN Nausea and/or Vomiting      Allergies  aspirin (Rash)      Review of Systems:  Gen: no fever or chills  CV: no CP or SOB  Resp: no dyspnea, cough  Ext: no swelling or pain  Skin: no rash or pruritis    Vital Signs Last 24 Hrs  T(C): 36.8 (01 Oct 2018 07:40), Max: 37.7 (30 Sep 2018 20:00)  T(F): 98.2 (01 Oct 2018 07:40), Max: 99.9 (30 Sep 2018 20:00)  HR: 68 (01 Oct 2018 07:40) (57 - 86)  BP: 156/93 (01 Oct 2018 07:40) (116/67 - 156/93)  BP(mean): --  RR: 18 (01 Oct 2018 07:40) (18 - 18)  SpO2: 98% (01 Oct 2018 07:40) (95% - 98%)    PHYSICAL EXAM:  Constitutional: non toxic ,in NAD, well-developed  Neck:  supple  Respiratory: clear b/l  Cardiovascular: S1 and S2, RRR  Gastrointestinal: BS+, soft, non tender, non distended , no rebound guarding or rigidity  Extremities: No peripheral edema  Neurological: A/O x 3, no focal deficits  Psychiatric: Normal mood, normal affect  Skin: No jaundice or visible rashes    LABS:                        10.9   3.28  )-----------( 225      ( 01 Oct 2018 10:21 )             34.4     10-01    142  |  107  |  <4<L>  ----------------------------<  84  3.5   |  25  |  0.57    Ca    8.8      01 Oct 2018 08:00  Phos  3.0     10-01  Mg     2.4     10-01    TPro  6.6  /  Alb  3.9  /  TBili  0.8  /  DBili  0.2  /  AST  37  /  ALT  162<H>  /  AlkPhos  77  10-01    Aspartate Aminotransferase (AST/SGOT): 81 U/L (09.30.18 @ 07:18)  Alanine Aminotransferase (ALT/SGPT): 228 U/L (09.30.18 @ 07:18)      PT/INR - ( 01 Oct 2018 10:21 )   PT: 11.7 sec;   INR: 1.03 ratio    PTT - ( 01 Oct 2018 10:21 )  PTT:33.1 sec        RADIOLOGY & ADDITIONAL TESTS:

## 2018-10-01 NOTE — PROGRESS NOTE ADULT - ATTENDING COMMENTS
Patient seen and examined.  Agree with  above note.    I was physically present for the key portions of the evaluation and management (E/M) service provided.  I agree with the above history, physical, and plan which I have reviewed and edited where appropriate.    Julian Garrett MD, FACG, FACP  [phone]

## 2018-10-01 NOTE — PROGRESS NOTE ADULT - SUBJECTIVE AND OBJECTIVE BOX
Patient is a 57y old  Female who presents with a chief complaint of abdominal pain (01 Oct 2018 08:24)      SUBJECTIVE / OVERNIGHT EVENTS:  no further pain or n/v.  MEDICATIONS  (STANDING):    MEDICATIONS  (PRN):              PHYSICAL EXAM:  GENERAL: NAD, well-developed  HEAD:  Atraumatic, Normocephalic  EYES: EOMI, PERRLA, conjunctiva and sclera anicteric  NECK: Supple, No JVD  CHEST/LUNG: Clear to auscultation bilaterally; No wheeze  HEART: Regular rate and rhythm; No murmurs, rubs, or gallops  ABDOMEN: Soft, Nontender, Nondistended; Bowel sounds present, no hepatosplenomegaly, no rebound or guarding  EXTREMITIES:  2+ Peripheral Pulses, No clubbing, cyanosis, or edema  PSYCH: AAOx3  NEUROLOGY: non-focal, no asterixis  SKIN: No rashes or lesion    LABS:                        10.8   3.84  )-----------( 238      ( 30 Sep 2018 08:11 )             34.1     10    142  |  107  |  <4<L>  ----------------------------<  84  3.5   |  25  |  0.57    Ca    8.8      01 Oct 2018 08:00  Phos  3.0     10-  Mg     2.4     10    TPro  6.6  /  Alb  3.9  /  TBili  0.8  /  DBili  0.2  /  AST  37  /  ALT  162<H>  /  AlkPhos  77  10    LIVER FUNCTIONS - ( 01 Oct 2018 08:00 )  Alb: 3.9 g/dL / Pro: 6.6 g/dL / ALK PHOS: 77 U/L / ALT: 162 U/L / AST: 37 U/L / GGT: x           PT/INR - ( 30 Sep 2018 08:09 )   PT: 11.1 sec;   INR: 0.98 ratio         PTT - ( 30 Sep 2018 08:09 )  PTT:31.2 sec      Urinalysis Basic - ( 30 Sep 2018 16:57 )    Color: Yellow / Appearance: Clear / S.006 / pH: x  Gluc: x / Ketone: Negative  / Bili: Negative / Urobili: 1.0 mg/dL   Blood: x / Protein: Negative mg/dL / Nitrite: Negative   Leuk Esterase: Negative / RBC: x / WBC x   Sq Epi: x / Non Sq Epi: x / Bacteria: x        RADIOLOGY & ADDITIONAL TESTS:

## 2018-10-01 NOTE — PROGRESS NOTE ADULT - SUBJECTIVE AND OBJECTIVE BOX
Jodi White M.D.   PGY-2 | Internal Medicine   [phone] | [id number]      Patient is a 57y old  Female who presents with a chief complaint of abdominal pain (01 Oct 2018 03:37)        SUBJECTIVE / OVERNIGHT EVENTS: Patient had no acute events overnight. Patient seen and examined at bedside this morning. Patient denies abdominal pain this morning. Patient is NPO for procedure today.     ROS: [ - ] Fever [ - ] Chills [ - ] Nausea/Vomiting [ - ] Chest Pain [ - ] Shortness of breath     MEDICATIONS  (STANDING):    MEDICATIONS  (PRN):      Vital Signs Last 24 Hrs  T(C): 36.8 (01 Oct 2018 07:40), Max: 37.7 (30 Sep 2018 20:00)  T(F): 98.2 (01 Oct 2018 07:40), Max: 99.9 (30 Sep 2018 20:00)  HR: 68 (01 Oct 2018 07:40) (57 - 86)  BP: 156/93 (01 Oct 2018 07:40) (112/70 - 156/93)  BP(mean): --  RR: 18 (01 Oct 2018 07:40) (18 - 18)  SpO2: 98% (01 Oct 2018 07:40) (95% - 98%)  CAPILLARY BLOOD GLUCOSE        I&O's Summary    30 Sep 2018 07:01  -  01 Oct 2018 07:00  --------------------------------------------------------  IN: 1280 mL / OUT: 0 mL / NET: 1280 mL        PHYSICAL EXAM  GENERAL: NAD, sitting up comfortably in bed   HEAD:  Atraumatic, Normocephalic  EYES: EOMI, PERRLA, conjunctiva and sclera clear  NECK: Supple, No JVD  CHEST/LUNG: Clear to auscultation bilaterally; No wheeze  HEART: Regular rate and rhythm; No murmurs, rubs, or gallops  ABDOMEN: Soft, Nontender, Nondistended; Bowel sounds present  EXTREMITIES:  2+ Peripheral Pulses, No clubbing, cyanosis, or edema  NEURO: AAOx3, non-focal  SKIN: No rashes or lesions      LABS:                        10.8   3.84  )-----------( 238      ( 30 Sep 2018 08:11 )             34.1         141  |  106  |  4<L>  ----------------------------<  87  3.7   |  26  |  0.57    Ca    9.1      30 Sep 2018 07:18  Phos  2.6       Mg     2.3         TPro  7.0  /  Alb  4.2  /  TBili  0.8  /  DBili  0.2  /  AST  81<H>  /  ALT  228<H>  /  AlkPhos  82  30    PT/INR - ( 30 Sep 2018 08:09 )   PT: 11.1 sec;   INR: 0.98 ratio         PTT - ( 30 Sep 2018 08:09 )  PTT:31.2 sec      Urinalysis Basic - ( 30 Sep 2018 16:57 )    Color: Yellow / Appearance: Clear / S.006 / pH: x  Gluc: x / Ketone: Negative  / Bili: Negative / Urobili: 1.0 mg/dL   Blood: x / Protein: Negative mg/dL / Nitrite: Negative   Leuk Esterase: Negative / RBC: x / WBC x   Sq Epi: x / Non Sq Epi: x / Bacteria: x          Consultant(s) Notes Reviewed:  GI, Surgery

## 2018-10-01 NOTE — PROGRESS NOTE ADULT - ASSESSMENT
Patient is a 57 year old female with history of diverticulosis/diverticulitis, HTN, scoliosis with surgery and placement of a metallic tal in the back, admitted with elevated LFTs, chest pain radiating to her back , right side abdominal discomfort with nausea and vomiting.   Elevated LFTs now improving, acute hepatitis panel negative.    MRI/MRCP contraindicated with metallic tal in back ( prior scoliosis surgery )   CT - gallstones, no intrahepatic or ductal dilation  Abdominal ultrasound with CBD at 8mm and gallstones.  HIDA - negative for acute cholecystitis, tracer visualized in SB  Suspect passed CBD stone as clinically improving and LFTs normalizing.    Recommendations:  Timing of  cholecystectomy per surgery schedule.  Follow LFTs  Continue ursodiol.  No plan for EUS or ERCP at this time, however if clinically worsening, or has rising LFTs, then will need EUS to r/o choledocholithiasis.      Cuauhtemoc Barkley PA-C    [facility]  [phone]

## 2018-10-01 NOTE — PROGRESS NOTE ADULT - ASSESSMENT
A: 58 yo female presented with epigastric and right upper quadrant pain. Patient is nontender on exam (though medicated) with no sonographic evidence of inflammation, given elevated LFT and T. bili and dilated CBD, likely a passed stone vs. choledocholithiasis    P:  - To OR for lap callie with intraop cholangiogram- add on with Dr. Whitt  - Consent is done, pre-op labs ordered  - NPO at midnight  - Trend LFT  - DVT ppx    Misa Harry, PGY-1  General Surgery Green Team [phone]

## 2018-10-01 NOTE — PROGRESS NOTE ADULT - PROBLEM SELECTOR PLAN 1
Currently resolved. Surgery believes that patient may have passed stone   - initial lipase 175, TBili 1.8, , ALT  337, improving  - Patient with normal WBC count and afebrile. Nontoxic appearing   - ultrasound shows distended gallbladder and numerous tiny mobile gallstones. Normal gallbladder wall thickness. Mild dilatation of the common bile duct. CT scan shows stones but no evidence of acute callie. HIDA shows no evidence of acute callie   - holding antibiotics in the setting of normal WBC, afebrile, nontoxic appearing. history of C diff  - NPO for cholecystectomy today

## 2018-10-01 NOTE — PROGRESS NOTE ADULT - PROBLEM SELECTOR PLAN 2
- no evidence of choledocholithiasis on scans   - HIDA and CT as above  - surgery following, plan for OR today

## 2018-10-02 ENCOUNTER — RESULT REVIEW (OUTPATIENT)
Age: 58
End: 2018-10-02

## 2018-10-02 ENCOUNTER — APPOINTMENT (OUTPATIENT)
Dept: SURGERY | Facility: HOSPITAL | Age: 58
End: 2018-10-02
Payer: SELF-PAY

## 2018-10-02 LAB
ALBUMIN SERPL ELPH-MCNC: 4 G/DL — SIGNIFICANT CHANGE UP (ref 3.3–5)
ALP SERPL-CCNC: 78 U/L — SIGNIFICANT CHANGE UP (ref 40–120)
ALT FLD-CCNC: 129 U/L — HIGH (ref 10–45)
ANION GAP SERPL CALC-SCNC: 9 MMOL/L — SIGNIFICANT CHANGE UP (ref 5–17)
APTT BLD: 31.4 SEC — SIGNIFICANT CHANGE UP (ref 27.5–37.4)
AST SERPL-CCNC: 27 U/L — SIGNIFICANT CHANGE UP (ref 10–40)
BASOPHILS # BLD AUTO: 0.01 K/UL — SIGNIFICANT CHANGE UP (ref 0–0.2)
BASOPHILS NFR BLD AUTO: 0.2 % — SIGNIFICANT CHANGE UP (ref 0–2)
BILIRUB SERPL-MCNC: 0.9 MG/DL — SIGNIFICANT CHANGE UP (ref 0.2–1.2)
BLD GP AB SCN SERPL QL: NEGATIVE — SIGNIFICANT CHANGE UP
BUN SERPL-MCNC: 5 MG/DL — LOW (ref 7–23)
CALCIUM SERPL-MCNC: 9.5 MG/DL — SIGNIFICANT CHANGE UP (ref 8.4–10.5)
CHLORIDE SERPL-SCNC: 103 MMOL/L — SIGNIFICANT CHANGE UP (ref 96–108)
CO2 SERPL-SCNC: 29 MMOL/L — SIGNIFICANT CHANGE UP (ref 22–31)
CREAT SERPL-MCNC: 0.58 MG/DL — SIGNIFICANT CHANGE UP (ref 0.5–1.3)
EOSINOPHIL # BLD AUTO: 0.16 K/UL — SIGNIFICANT CHANGE UP (ref 0–0.5)
EOSINOPHIL NFR BLD AUTO: 4 % — SIGNIFICANT CHANGE UP (ref 0–6)
GLUCOSE SERPL-MCNC: 88 MG/DL — SIGNIFICANT CHANGE UP (ref 70–99)
HCG SERPL-ACNC: 2.5 MIU/ML — SIGNIFICANT CHANGE UP
HCT VFR BLD CALC: 34.7 % — SIGNIFICANT CHANGE UP (ref 34.5–45)
HCT VFR BLD CALC: 36.3 % — SIGNIFICANT CHANGE UP (ref 34.5–45)
HGB BLD-MCNC: 11.2 G/DL — LOW (ref 11.5–15.5)
HGB BLD-MCNC: 11.2 G/DL — LOW (ref 11.5–15.5)
IMM GRANULOCYTES NFR BLD AUTO: 0.5 % — SIGNIFICANT CHANGE UP (ref 0–1.5)
INR BLD: 1.04 RATIO — SIGNIFICANT CHANGE UP (ref 0.88–1.16)
LYMPHOCYTES # BLD AUTO: 1.4 K/UL — SIGNIFICANT CHANGE UP (ref 1–3.3)
LYMPHOCYTES # BLD AUTO: 34.7 % — SIGNIFICANT CHANGE UP (ref 13–44)
MAGNESIUM SERPL-MCNC: 2.5 MG/DL — SIGNIFICANT CHANGE UP (ref 1.6–2.6)
MCHC RBC-ENTMCNC: 21.3 PG — LOW (ref 27–34)
MCHC RBC-ENTMCNC: 22.1 PG — LOW (ref 27–34)
MCHC RBC-ENTMCNC: 30.9 GM/DL — LOW (ref 32–36)
MCHC RBC-ENTMCNC: 32.4 GM/DL — SIGNIFICANT CHANGE UP (ref 32–36)
MCV RBC AUTO: 68.3 FL — LOW (ref 80–100)
MCV RBC AUTO: 68.9 FL — LOW (ref 80–100)
MONOCYTES # BLD AUTO: 0.4 K/UL — SIGNIFICANT CHANGE UP (ref 0–0.9)
MONOCYTES NFR BLD AUTO: 9.9 % — SIGNIFICANT CHANGE UP (ref 2–14)
NEUTROPHILS # BLD AUTO: 2.04 K/UL — SIGNIFICANT CHANGE UP (ref 1.8–7.4)
NEUTROPHILS NFR BLD AUTO: 50.7 % — SIGNIFICANT CHANGE UP (ref 43–77)
PHOSPHATE SERPL-MCNC: 3.7 MG/DL — SIGNIFICANT CHANGE UP (ref 2.5–4.5)
PLATELET # BLD AUTO: 262 K/UL — SIGNIFICANT CHANGE UP (ref 150–400)
PLATELET # BLD AUTO: 292 K/UL — SIGNIFICANT CHANGE UP (ref 150–400)
POTASSIUM SERPL-MCNC: 3.6 MMOL/L — SIGNIFICANT CHANGE UP (ref 3.5–5.3)
POTASSIUM SERPL-SCNC: 3.6 MMOL/L — SIGNIFICANT CHANGE UP (ref 3.5–5.3)
PROT SERPL-MCNC: 7.4 G/DL — SIGNIFICANT CHANGE UP (ref 6–8.3)
PROTHROM AB SERPL-ACNC: 11.8 SEC — SIGNIFICANT CHANGE UP (ref 10–13.1)
RBC # BLD: 5.08 M/UL — SIGNIFICANT CHANGE UP (ref 3.8–5.2)
RBC # BLD: 5.27 M/UL — HIGH (ref 3.8–5.2)
RBC # FLD: 13.9 % — SIGNIFICANT CHANGE UP (ref 10.3–14.5)
RBC # FLD: 15.6 % — HIGH (ref 10.3–14.5)
RH IG SCN BLD-IMP: POSITIVE — SIGNIFICANT CHANGE UP
SODIUM SERPL-SCNC: 141 MMOL/L — SIGNIFICANT CHANGE UP (ref 135–145)
WBC # BLD: 4.03 K/UL — SIGNIFICANT CHANGE UP (ref 3.8–10.5)
WBC # BLD: 7 K/UL — SIGNIFICANT CHANGE UP (ref 3.8–10.5)
WBC # FLD AUTO: 4.03 K/UL — SIGNIFICANT CHANGE UP (ref 3.8–10.5)
WBC # FLD AUTO: 7 K/UL — SIGNIFICANT CHANGE UP (ref 3.8–10.5)

## 2018-10-02 PROCEDURE — 47563 LAPARO CHOLECYSTECTOMY/GRAPH: CPT

## 2018-10-02 PROCEDURE — 99233 SBSQ HOSP IP/OBS HIGH 50: CPT

## 2018-10-02 PROCEDURE — 47563 LAPARO CHOLECYSTECTOMY/GRAPH: CPT | Mod: 82

## 2018-10-02 PROCEDURE — 88304 TISSUE EXAM BY PATHOLOGIST: CPT | Mod: 26

## 2018-10-02 RX ORDER — DEXTROSE MONOHYDRATE, SODIUM CHLORIDE, AND POTASSIUM CHLORIDE 50; .745; 4.5 G/1000ML; G/1000ML; G/1000ML
1000 INJECTION, SOLUTION INTRAVENOUS
Qty: 0 | Refills: 0 | Status: DISCONTINUED | OUTPATIENT
Start: 2018-10-02 | End: 2018-10-03

## 2018-10-02 RX ORDER — ACETAMINOPHEN 500 MG
650 TABLET ORAL EVERY 6 HOURS
Qty: 0 | Refills: 0 | Status: DISCONTINUED | OUTPATIENT
Start: 2018-10-02 | End: 2018-10-03

## 2018-10-02 RX ORDER — ENOXAPARIN SODIUM 100 MG/ML
40 INJECTION SUBCUTANEOUS DAILY
Qty: 0 | Refills: 0 | Status: DISCONTINUED | OUTPATIENT
Start: 2018-10-02 | End: 2018-10-03

## 2018-10-02 RX ORDER — OXYCODONE HYDROCHLORIDE 5 MG/1
5 TABLET ORAL EVERY 6 HOURS
Qty: 0 | Refills: 0 | Status: DISCONTINUED | OUTPATIENT
Start: 2018-10-02 | End: 2018-10-03

## 2018-10-02 RX ORDER — HYDROMORPHONE HYDROCHLORIDE 2 MG/ML
0.5 INJECTION INTRAMUSCULAR; INTRAVENOUS; SUBCUTANEOUS
Qty: 0 | Refills: 0 | Status: DISCONTINUED | OUTPATIENT
Start: 2018-10-02 | End: 2018-10-02

## 2018-10-02 RX ORDER — ONDANSETRON 8 MG/1
4 TABLET, FILM COATED ORAL ONCE
Qty: 0 | Refills: 0 | Status: DISCONTINUED | OUTPATIENT
Start: 2018-10-02 | End: 2018-10-02

## 2018-10-02 RX ADMIN — DEXTROSE MONOHYDRATE, SODIUM CHLORIDE, AND POTASSIUM CHLORIDE 100 MILLILITER(S): 50; .745; 4.5 INJECTION, SOLUTION INTRAVENOUS at 13:38

## 2018-10-02 RX ADMIN — Medication 650 MILLIGRAM(S): at 17:41

## 2018-10-02 RX ADMIN — DEXTROSE MONOHYDRATE, SODIUM CHLORIDE, AND POTASSIUM CHLORIDE 50 MILLILITER(S): 50; .745; 4.5 INJECTION, SOLUTION INTRAVENOUS at 17:43

## 2018-10-02 NOTE — CHART NOTE - NSCHARTNOTEFT_GEN_A_CORE
Post-operative Check    SUBJECTIVE: No acute events in the immediate post-operative period.   Pain well controlled. Pt does not want narcs for pain control.  Denies n/v, cp, sob  c/o mild abdominal pain, back pain  not yet ambulating  tolerating clears  -flatus, -bm    OBJECTIVE:  T(C): 36.5 (10-02-18 @ 16:00), Max: 37 (10-01-18 @ 19:17)  HR: 82 (10-02-18 @ 16:00) (59 - 82)  BP: 145/83 (10-02-18 @ 16:00) (113/57 - 148/80)  RR: 18 (10-02-18 @ 16:00) (14 - 20)  SpO2: 99% (10-02-18 @ 16:00) (95% - 100%)      10-01-18 @ 07:01  -  10-02-18 @ 07:00  --------------------------------------------------------  IN: 0 mL / OUT: 0 mL / NET: 0 mL    10-02-18 @ 07:01  -  10-02-18 @ 16:26  --------------------------------------------------------  IN: 400 mL / OUT: 0 mL / NET: 400 mL        Physical Exam:   - Constitutional: AOx3, NAD, sleeping comfortably in bed.  - HEENT: ncat, eomi  - Neck: supple  - Respiratory: nonlabored  - Abdomen: soft, nontender, mildly distended, c/d/i incisions/dressings  - Extremities: drake      ASSESSMENT:   JESSE TEJEDA is a 57y Female POD#0 from Cholecystectomy with intraoperative cholangiography.       PLAN:  - Pain management  - Follow UOP, vs  - monitor GI function  - advance diet as tolerated  - encourage oob, incentive spirometry  - Appropriate for transfer to the floor

## 2018-10-02 NOTE — PROGRESS NOTE ADULT - ATTENDING COMMENTS
I saw and examined the pt and discussed the tx plan with the House Staff. I agree with the exam and plan as documented in the surgery resident's note from today.  LFTs continue to down trend.  Preop for EDDI krishnan today.   Estee Whitt MD

## 2018-10-02 NOTE — BRIEF OPERATIVE NOTE - PROCEDURE
<<-----Click on this checkbox to enter Procedure Cholecystectomy with intraoperative cholangiography  10/02/2018    Active  [id number]

## 2018-10-02 NOTE — BRIEF OPERATIVE NOTE - OPERATION/FINDINGS
Please see full dictation. Edema around gallbladder. Gallbladder with stones. IOC negative for stones in CDB.

## 2018-10-02 NOTE — PROGRESS NOTE ADULT - SUBJECTIVE AND OBJECTIVE BOX
General Surgery Progress Note    SUBJECTIVE:  The patient was seen and examined. No acute events overnight. Denies N/V, +flatus/+BM. OOB/amb. Denies abd pain.     OBJECTIVE:     ** VITAL SIGNS / I&O's **    Vital Signs Last 24 Hrs  T(C): 37 (01 Oct 2018 19:17), Max: 37 (01 Oct 2018 19:17)  T(F): 98.6 (01 Oct 2018 19:17), Max: 98.6 (01 Oct 2018 19:17)  HR: 73 (01 Oct 2018 19:17) (57 - 73)  BP: 136/75 (01 Oct 2018 19:17) (133/76 - 156/93)  BP(mean): --  RR: 20 (01 Oct 2018 19:17) (18 - 20)  SpO2: 98% (01 Oct 2018 19:17) (97% - 98%)      30 Sep 2018 07:01  -  01 Oct 2018 07:00  --------------------------------------------------------  IN:    Oral Fluid: 500 mL    sodium chloride 0.9%: 780 mL  Total IN: 1280 mL    OUT:  Total OUT: 0 mL    Total NET: 1280 mL          ** PHYSICAL EXAM **    -- CONSTITUTIONAL: Alert, NAD  -- PULMONARY: non-labored respirations  -- ABDOMEN: soft, non-distended, non-tender  -- NEURO: A&Ox3    ** LABS **                          10.9   3.28  )-----------( 225      ( 01 Oct 2018 10:21 )             34.4     01 Oct 2018 08:00    142    |  107    |  <4     ----------------------------<  84     3.5     |  25     |  0.57     Ca    8.8        01 Oct 2018 08:00  Phos  3.0       01 Oct 2018 08:00  Mg     2.4       01 Oct 2018 08:00    TPro  6.6    /  Alb  3.9    /  TBili  0.8    /  DBili  0.2    /  AST  37     /  ALT  162    /  AlkPhos  77     01 Oct 2018 08:00    PT/INR - ( 01 Oct 2018 10:21 )   PT: 11.7 sec;   INR: 1.03 ratio         PTT - ( 01 Oct 2018 10:21 )  PTT:33.1 sec  CAPILLARY BLOOD GLUCOSE            LIVER FUNCTIONS - ( 01 Oct 2018 08:00 )  Alb: 3.9 g/dL / Pro: 6.6 g/dL / ALK PHOS: 77 U/L / ALT: 162 U/L / AST: 37 U/L / GGT: x             Urinalysis Basic - ( 30 Sep 2018 16:57 )    Color: Yellow / Appearance: Clear / S.006 / pH: x  Gluc: x / Ketone: Negative  / Bili: Negative / Urobili: 1.0 mg/dL   Blood: x / Protein: Negative mg/dL / Nitrite: Negative   Leuk Esterase: Negative / RBC: x / WBC x   Sq Epi: x / Non Sq Epi: x / Bacteria: x        MEDICATIONS  (STANDING):  losartan 25 milliGRAM(s) Oral daily  pantoprazole  Injectable 40 milliGRAM(s) IV Push daily    MEDICATIONS  (PRN):  morphine  - Injectable 2 milliGRAM(s) IV Push every 4 hours PRN Severe Pain (7 - 10)  ondansetron Injectable 4 milliGRAM(s) IV Push every 8 hours PRN Nausea and/or Vomiting

## 2018-10-02 NOTE — PROGRESS NOTE ADULT - ASSESSMENT
A: 56 yo female presented with epigastric and right upper quadrant pain. Patient is nontender on exam (though medicated) with no sonographic evidence of inflammation, given elevated LFT and T. bili and dilated CBD, likely a passed stone vs. choledocholithiasis    P:  - To OR for lap callie with intraop cholangiogram- with Dr. Whitt  - Consent is done, pre-op labs ordered  - NPO at midnight  - Trend LFT  - DVT ppx    Misa Harry, PGY-1  General Surgery Green Team [phone]

## 2018-10-03 ENCOUNTER — RX RENEWAL (OUTPATIENT)
Age: 58
End: 2018-10-03

## 2018-10-03 VITALS
DIASTOLIC BLOOD PRESSURE: 80 MMHG | TEMPERATURE: 98 F | RESPIRATION RATE: 18 BRPM | SYSTOLIC BLOOD PRESSURE: 140 MMHG | HEART RATE: 70 BPM | OXYGEN SATURATION: 95 %

## 2018-10-03 LAB
ALBUMIN SERPL ELPH-MCNC: 3.8 G/DL — SIGNIFICANT CHANGE UP (ref 3.3–5)
ALP SERPL-CCNC: 67 U/L — SIGNIFICANT CHANGE UP (ref 40–120)
ALT FLD-CCNC: 101 U/L — HIGH (ref 10–45)
ANION GAP SERPL CALC-SCNC: 9 MMOL/L — SIGNIFICANT CHANGE UP (ref 5–17)
AST SERPL-CCNC: 33 U/L — SIGNIFICANT CHANGE UP (ref 10–40)
BILIRUB DIRECT SERPL-MCNC: 0.1 MG/DL — SIGNIFICANT CHANGE UP (ref 0–0.2)
BILIRUB INDIRECT FLD-MCNC: 0.4 MG/DL — SIGNIFICANT CHANGE UP (ref 0.2–1)
BILIRUB SERPL-MCNC: 0.5 MG/DL — SIGNIFICANT CHANGE UP (ref 0.2–1.2)
BUN SERPL-MCNC: 5 MG/DL — LOW (ref 7–23)
CALCIUM SERPL-MCNC: 8.7 MG/DL — SIGNIFICANT CHANGE UP (ref 8.4–10.5)
CHLORIDE SERPL-SCNC: 104 MMOL/L — SIGNIFICANT CHANGE UP (ref 96–108)
CO2 SERPL-SCNC: 26 MMOL/L — SIGNIFICANT CHANGE UP (ref 22–31)
CREAT SERPL-MCNC: 0.61 MG/DL — SIGNIFICANT CHANGE UP (ref 0.5–1.3)
CULTURE RESULTS: SIGNIFICANT CHANGE UP
CULTURE RESULTS: SIGNIFICANT CHANGE UP
GLUCOSE SERPL-MCNC: 111 MG/DL — HIGH (ref 70–99)
HCT VFR BLD CALC: 33.1 % — LOW (ref 34.5–45)
HGB BLD-MCNC: 10.3 G/DL — LOW (ref 11.5–15.5)
MAGNESIUM SERPL-MCNC: 2.6 MG/DL — SIGNIFICANT CHANGE UP (ref 1.6–2.6)
MCHC RBC-ENTMCNC: 21.4 PG — LOW (ref 27–34)
MCHC RBC-ENTMCNC: 31.1 GM/DL — LOW (ref 32–36)
MCV RBC AUTO: 68.8 FL — LOW (ref 80–100)
PHOSPHATE SERPL-MCNC: 3.2 MG/DL — SIGNIFICANT CHANGE UP (ref 2.5–4.5)
PLATELET # BLD AUTO: 290 K/UL — SIGNIFICANT CHANGE UP (ref 150–400)
POTASSIUM SERPL-MCNC: 3.9 MMOL/L — SIGNIFICANT CHANGE UP (ref 3.5–5.3)
POTASSIUM SERPL-SCNC: 3.9 MMOL/L — SIGNIFICANT CHANGE UP (ref 3.5–5.3)
PROT SERPL-MCNC: 6.9 G/DL — SIGNIFICANT CHANGE UP (ref 6–8.3)
RBC # BLD: 4.81 M/UL — SIGNIFICANT CHANGE UP (ref 3.8–5.2)
RBC # FLD: 15.6 % — HIGH (ref 10.3–14.5)
SODIUM SERPL-SCNC: 139 MMOL/L — SIGNIFICANT CHANGE UP (ref 135–145)
SPECIMEN SOURCE: SIGNIFICANT CHANGE UP
SPECIMEN SOURCE: SIGNIFICANT CHANGE UP
WBC # BLD: 6.87 K/UL — SIGNIFICANT CHANGE UP (ref 3.8–10.5)
WBC # FLD AUTO: 6.87 K/UL — SIGNIFICANT CHANGE UP (ref 3.8–10.5)

## 2018-10-03 PROCEDURE — 80053 COMPREHEN METABOLIC PANEL: CPT

## 2018-10-03 PROCEDURE — 96374 THER/PROPH/DIAG INJ IV PUSH: CPT

## 2018-10-03 PROCEDURE — 93005 ELECTROCARDIOGRAM TRACING: CPT

## 2018-10-03 PROCEDURE — 86900 BLOOD TYPING SEROLOGIC ABO: CPT

## 2018-10-03 PROCEDURE — 81003 URINALYSIS AUTO W/O SCOPE: CPT

## 2018-10-03 PROCEDURE — 80076 HEPATIC FUNCTION PANEL: CPT

## 2018-10-03 PROCEDURE — 85027 COMPLETE CBC AUTOMATED: CPT

## 2018-10-03 PROCEDURE — 81001 URINALYSIS AUTO W/SCOPE: CPT

## 2018-10-03 PROCEDURE — 84702 CHORIONIC GONADOTROPIN TEST: CPT

## 2018-10-03 PROCEDURE — 87040 BLOOD CULTURE FOR BACTERIA: CPT

## 2018-10-03 PROCEDURE — 78226 HEPATOBILIARY SYSTEM IMAGING: CPT

## 2018-10-03 PROCEDURE — 80048 BASIC METABOLIC PNL TOTAL CA: CPT

## 2018-10-03 PROCEDURE — 71045 X-RAY EXAM CHEST 1 VIEW: CPT

## 2018-10-03 PROCEDURE — 82330 ASSAY OF CALCIUM: CPT

## 2018-10-03 PROCEDURE — 90686 IIV4 VACC NO PRSV 0.5 ML IM: CPT

## 2018-10-03 PROCEDURE — 84100 ASSAY OF PHOSPHORUS: CPT

## 2018-10-03 PROCEDURE — 76700 US EXAM ABDOM COMPLETE: CPT

## 2018-10-03 PROCEDURE — 83605 ASSAY OF LACTIC ACID: CPT

## 2018-10-03 PROCEDURE — 84132 ASSAY OF SERUM POTASSIUM: CPT

## 2018-10-03 PROCEDURE — 85014 HEMATOCRIT: CPT

## 2018-10-03 PROCEDURE — 83735 ASSAY OF MAGNESIUM: CPT

## 2018-10-03 PROCEDURE — 74177 CT ABD & PELVIS W/CONTRAST: CPT

## 2018-10-03 PROCEDURE — 85610 PROTHROMBIN TIME: CPT

## 2018-10-03 PROCEDURE — 84295 ASSAY OF SERUM SODIUM: CPT

## 2018-10-03 PROCEDURE — 96375 TX/PRO/DX INJ NEW DRUG ADDON: CPT

## 2018-10-03 PROCEDURE — 74300 X-RAY BILE DUCTS/PANCREAS: CPT

## 2018-10-03 PROCEDURE — 83690 ASSAY OF LIPASE: CPT

## 2018-10-03 PROCEDURE — 82803 BLOOD GASES ANY COMBINATION: CPT

## 2018-10-03 PROCEDURE — A9537: CPT

## 2018-10-03 PROCEDURE — 84484 ASSAY OF TROPONIN QUANT: CPT

## 2018-10-03 PROCEDURE — 86901 BLOOD TYPING SEROLOGIC RH(D): CPT

## 2018-10-03 PROCEDURE — 88304 TISSUE EXAM BY PATHOLOGIST: CPT

## 2018-10-03 PROCEDURE — 86850 RBC ANTIBODY SCREEN: CPT

## 2018-10-03 PROCEDURE — 82435 ASSAY OF BLOOD CHLORIDE: CPT

## 2018-10-03 PROCEDURE — 96376 TX/PRO/DX INJ SAME DRUG ADON: CPT

## 2018-10-03 PROCEDURE — 87086 URINE CULTURE/COLONY COUNT: CPT

## 2018-10-03 PROCEDURE — 82947 ASSAY GLUCOSE BLOOD QUANT: CPT

## 2018-10-03 PROCEDURE — 85730 THROMBOPLASTIN TIME PARTIAL: CPT

## 2018-10-03 PROCEDURE — 99285 EMERGENCY DEPT VISIT HI MDM: CPT | Mod: 25

## 2018-10-03 PROCEDURE — 80074 ACUTE HEPATITIS PANEL: CPT

## 2018-10-03 RX ORDER — ACETAMINOPHEN 500 MG
2 TABLET ORAL
Qty: 0 | Refills: 0 | COMMUNITY
Start: 2018-10-03

## 2018-10-03 RX ORDER — OXYCODONE HYDROCHLORIDE 5 MG/1
1 TABLET ORAL
Qty: 0 | Refills: 0 | COMMUNITY
Start: 2018-10-03

## 2018-10-03 RX ORDER — OXYCODONE HYDROCHLORIDE 5 MG/1
1 TABLET ORAL
Qty: 30 | Refills: 0 | OUTPATIENT
Start: 2018-10-03

## 2018-10-03 RX ORDER — INFLUENZA VIRUS VACCINE 15; 15; 15; 15 UG/.5ML; UG/.5ML; UG/.5ML; UG/.5ML
0.5 SUSPENSION INTRAMUSCULAR ONCE
Qty: 0 | Refills: 0 | Status: COMPLETED | OUTPATIENT
Start: 2018-10-03 | End: 2018-10-03

## 2018-10-03 RX ADMIN — ENOXAPARIN SODIUM 40 MILLIGRAM(S): 100 INJECTION SUBCUTANEOUS at 11:52

## 2018-10-03 RX ADMIN — Medication 650 MILLIGRAM(S): at 00:11

## 2018-10-03 RX ADMIN — Medication 650 MILLIGRAM(S): at 11:52

## 2018-10-03 RX ADMIN — INFLUENZA VIRUS VACCINE 0.5 MILLILITER(S): 15; 15; 15; 15 SUSPENSION INTRAMUSCULAR at 10:52

## 2018-10-03 RX ADMIN — Medication 650 MILLIGRAM(S): at 05:11

## 2018-10-03 RX ADMIN — Medication 650 MILLIGRAM(S): at 17:54

## 2018-10-03 NOTE — PROGRESS NOTE ADULT - PROVIDER SPECIALTY LIST ADULT
Gastroenterology
Internal Medicine
Surgery
Gastroenterology
Surgery

## 2018-10-03 NOTE — PROGRESS NOTE ADULT - ASSESSMENT
Patient is a 57 year old female with history of diverticulosis/diverticulitis, HTN, scoliosis with surgery and placement of a metallic tal in the back, admitted with elevated LFTs, chest pain radiating to her back , right side abdominal discomfort with nausea and vomiting.   Elevated LFTs now improving, acute hepatitis panel negative.    MRI/MRCP contraindicated with metallic tal in back ( prior scoliosis surgery )   CT - gallstones, no intrahepatic or ductal dilation  Abdominal ultrasound with CBD at 8mm and gallstones.  HIDA - negative for acute cholecystitis, tracer visualized in SB  Suspect passed CBD stone as clinically improving and LFTs normalizing.  s/p lap callie with negative IOC; LFt nearly normalized    Recommendations:  Post op care per Surgery  PO diet as tolerated  Can d/c Ursodiol given s/p callie and negative IOC  No GI objection to discharge  follow up with Dr Witt within 2 weeks    Cuauhtemoc Barkley PA-C    [facility]  [phone]

## 2018-10-03 NOTE — PROGRESS NOTE ADULT - SUBJECTIVE AND OBJECTIVE BOX
Patient is a 57y old  Female who presented with a chief complaint of cholecystitis (03 Oct 2018 10:07)      INTERVAL HPI/OVERNIGHT EVENTS:  s/p lap callie  had negative IOC  no nausea or vomiting  appropriate incisional discomfort  tolerating PO    MEDICATIONS  (STANDING):  acetaminophen   Tablet .. 650 milliGRAM(s) Oral every 6 hours  enoxaparin Injectable 40 milliGRAM(s) SubCutaneous daily    MEDICATIONS  (PRN):  oxyCODONE    IR 5 milliGRAM(s) Oral every 6 hours PRN Moderate Pain (4 - 6)      Allergies  aspirin (Rash)      Review of Systems:    Gen: no fever or chills  CV: no CP or SOB  Resp: no dyspnea, cough  Ext: no swelling or pain  Skin: no rash or pruritis      Vital Signs Last 24 Hrs  T(C): 36.8 (03 Oct 2018 09:20), Max: 36.8 (03 Oct 2018 06:08)  T(F): 98.3 (03 Oct 2018 09:20), Max: 98.3 (03 Oct 2018 09:20)  HR: 68 (03 Oct 2018 09:20) (59 - 82)  BP: 128/77 (03 Oct 2018 09:20) (113/57 - 145/83)  BP(mean): 108 (02 Oct 2018 16:00) (79 - 108)  RR: 18 (03 Oct 2018 09:20) (14 - 20)  SpO2: 94% (03 Oct 2018 09:20) (94% - 100%)    PHYSICAL EXAM:  Gen: WD WN NAD pleasant female, sitting up in bed  Neck:  supple  Respiratory: clear b/l  Cardiovascular: S1 and S2, RRR  Gastrointestinal: BS+, soft sl distended +incision/port sites with dressing, appropriate flex-incisional tenderness   Extremities: No peripheral edema  Neurological: A/O x 3, no focal deficits  Psychiatric: Normal mood, normal affect  Skin: No jaundice or visible rashes  LABS:                        10.3   6.87  )-----------( 290      ( 03 Oct 2018 08:45 )             33.1     10-03    139  |  104  |  5<L>  ----------------------------<  111<H>  3.9   |  26  |  0.61    Ca    8.7      03 Oct 2018 07:27  Phos  3.2     10-03  Mg     2.6     10-03    TPro  6.9  /  Alb  3.8  /  TBili  0.5  /  DBili  0.1  /  AST  33  /  ALT  101<H>  /  AlkPhos  67  10-03      Aspartate Aminotransferase (AST/SGOT): 27 U/L (10.02.18 @ 07:13)  Alanine Aminotransferase (ALT/SGPT): 129 U/L (10.02.18 @ 07:13)        RADIOLOGY & ADDITIONAL TESTS:

## 2018-10-03 NOTE — PROGRESS NOTE ADULT - SUBJECTIVE AND OBJECTIVE BOX
General Surgery Progress Note    SUBJECTIVE:  The patient was seen and examined. No acute events overnight.   Denies N/V, -bowel function.  pain well controlled, wants no narcs for pain control though    OBJECTIVE:     ** VITAL SIGNS / I&O's **    Vital Signs Last 24 Hrs  T(C): 36.6 (02 Oct 2018 17:51), Max: 36.7 (02 Oct 2018 07:35)  T(F): 97.9 (02 Oct 2018 17:51), Max: 98.1 (02 Oct 2018 12:20)  HR: 66 (02 Oct 2018 17:51) (59 - 82)  BP: 117/74 (02 Oct 2018 17:51) (113/57 - 148/80)  BP(mean): 108 (02 Oct 2018 16:00) (79 - 108)  RR: 20 (02 Oct 2018 17:51) (14 - 20)  SpO2: 95% (02 Oct 2018 17:51) (95% - 100%)      01 Oct 2018 07:01  -  02 Oct 2018 07:00  --------------------------------------------------------  IN:  Total IN: 0 mL    OUT:  Total OUT: 0 mL    Total NET: 0 mL      02 Oct 2018 07:01  -  03 Oct 2018 02:20  --------------------------------------------------------  IN:    dextrose 5% + sodium chloride 0.45% with potassium chloride 20 mEq/L: 300 mL    Oral Fluid: 100 mL  Total IN: 400 mL    OUT:  Total OUT: 0 mL    Total NET: 400 mL        Physical Exam:   - Constitutional: AOx3, NAD, sleeping comfortably in bed.  - HEENT: ncat, eomi  - Neck: supple  - Respiratory: nonlabored  - Abdomen: soft, nontender, mildly distended, c/d/i incisions/dressings  - Extremities: drake    ** LABS **                          11.2   7.0   )-----------( 262      ( 02 Oct 2018 21:16 )             34.7     02 Oct 2018 07:13    141    |  103    |  5      ----------------------------<  88     3.6     |  29     |  0.58     Ca    9.5        02 Oct 2018 07:13  Phos  3.7       02 Oct 2018 07:13  Mg     2.5       02 Oct 2018 07:13    TPro  7.4    /  Alb  4.0    /  TBili  0.9    /  DBili  x      /  AST  27     /  ALT  129    /  AlkPhos  78     02 Oct 2018 07:13    PT/INR - ( 02 Oct 2018 07:54 )   PT: 11.8 sec;   INR: 1.04 ratio         PTT - ( 02 Oct 2018 07:54 )  PTT:31.4 sec  CAPILLARY BLOOD GLUCOSE            LIVER FUNCTIONS - ( 02 Oct 2018 07:13 )  Alb: 4.0 g/dL / Pro: 7.4 g/dL / ALK PHOS: 78 U/L / ALT: 129 U/L / AST: 27 U/L / GGT: x                 MEDICATIONS  (STANDING):  acetaminophen   Tablet .. 650 milliGRAM(s) Oral every 6 hours  dextrose 5% + sodium chloride 0.45% with potassium chloride 20 mEq/L 1000 milliLiter(s) (50 mL/Hr) IV Continuous <Continuous>  enoxaparin Injectable 40 milliGRAM(s) SubCutaneous daily    MEDICATIONS  (PRN):  oxyCODONE    IR 5 milliGRAM(s) Oral every 6 hours PRN Moderate Pain (4 - 6)

## 2018-10-03 NOTE — PROGRESS NOTE ADULT - ASSESSMENT
57y Female s/p Cholecystectomy with intraoperative cholangiography (10/2)      PLAN:  - Pain management  - Follow UOP, vs  - monitor GI function  - advance diet as tolerated  - encourage oob, incentive spirometry 57y Female s/p Lap Cholecystectomy with intraoperative cholangiography (10/2)      PLAN:  - Pain management  - Follow UOP, vs  - monitor GI function  - advance diet as tolerated  - encourage oob, incentive spirometry

## 2018-10-03 NOTE — PROGRESS NOTE ADULT - REASON FOR ADMISSION
abdominal pain

## 2018-10-03 NOTE — PROGRESS NOTE ADULT - ATTENDING COMMENTS
I saw and examined the pt and discussed the tx plan with the House Staff. I agree with the exam and plan as documented in the surgery resident's note from today which I edited as indicated.  Doing well, home today, f/u with me in the office in 10 days, will check f/u LFTs at that time.   Estee Whitt MD

## 2018-10-11 LAB — SURGICAL PATHOLOGY STUDY: SIGNIFICANT CHANGE UP

## 2018-10-15 ENCOUNTER — APPOINTMENT (OUTPATIENT)
Dept: SURGERY | Facility: CLINIC | Age: 58
End: 2018-10-15
Payer: SELF-PAY

## 2018-10-15 VITALS
WEIGHT: 108 LBS | DIASTOLIC BLOOD PRESSURE: 85 MMHG | TEMPERATURE: 98.1 F | HEIGHT: 62 IN | OXYGEN SATURATION: 97 % | RESPIRATION RATE: 14 BRPM | HEART RATE: 73 BPM | SYSTOLIC BLOOD PRESSURE: 135 MMHG | BODY MASS INDEX: 19.88 KG/M2

## 2018-10-15 DIAGNOSIS — Z86.69 PERSONAL HISTORY OF OTHER DISEASES OF THE NERVOUS SYSTEM AND SENSE ORGANS: ICD-10-CM

## 2018-10-15 DIAGNOSIS — K80.70 CALCULUS OF GALLBLADDER AND BILE DUCT W/OUT CHOLECYSTITIS W/OUT OBSTRUCTION: ICD-10-CM

## 2018-10-15 DIAGNOSIS — Z80.1 FAMILY HISTORY OF MALIGNANT NEOPLASM OF TRACHEA, BRONCHUS AND LUNG: ICD-10-CM

## 2018-10-15 PROCEDURE — 99024 POSTOP FOLLOW-UP VISIT: CPT

## 2018-10-15 RX ORDER — EFINACONAZOLE 100 MG/ML
10 SOLUTION TOPICAL
Qty: 1 | Refills: 1 | Status: DISCONTINUED | OUTPATIENT
Start: 2017-11-10 | End: 2018-10-15

## 2018-10-15 RX ORDER — POTASSIUM CHLORIDE 1500 MG/1
20 TABLET, EXTENDED RELEASE ORAL DAILY
Qty: 30 | Refills: 2 | Status: DISCONTINUED | COMMUNITY
Start: 2017-11-21 | End: 2018-10-15

## 2018-10-17 ENCOUNTER — OUTPATIENT (OUTPATIENT)
Dept: OUTPATIENT SERVICES | Facility: HOSPITAL | Age: 58
LOS: 1 days | End: 2018-10-17
Payer: SELF-PAY

## 2018-10-17 ENCOUNTER — APPOINTMENT (OUTPATIENT)
Dept: INTERNAL MEDICINE | Facility: CLINIC | Age: 58
End: 2018-10-17

## 2018-10-17 VITALS
DIASTOLIC BLOOD PRESSURE: 70 MMHG | BODY MASS INDEX: 19.69 KG/M2 | SYSTOLIC BLOOD PRESSURE: 124 MMHG | HEIGHT: 62 IN | WEIGHT: 107 LBS

## 2018-10-17 DIAGNOSIS — Z92.29 PERSONAL HISTORY OF OTHER DRUG THERAPY: ICD-10-CM

## 2018-10-17 DIAGNOSIS — Z01.818 ENCOUNTER FOR OTHER PREPROCEDURAL EXAMINATION: ICD-10-CM

## 2018-10-17 DIAGNOSIS — I10 ESSENTIAL (PRIMARY) HYPERTENSION: ICD-10-CM

## 2018-10-17 DIAGNOSIS — Z87.19 PERSONAL HISTORY OF OTHER DISEASES OF THE DIGESTIVE SYSTEM: ICD-10-CM

## 2018-10-17 DIAGNOSIS — J06.9 ACUTE UPPER RESPIRATORY INFECTION, UNSPECIFIED: ICD-10-CM

## 2018-10-17 DIAGNOSIS — K90.49 MALABSORPTION DUE TO INTOLERANCE, NOT ELSEWHERE CLASSIFIED: ICD-10-CM

## 2018-10-17 DIAGNOSIS — A04.72 ENTEROCOLITIS DUE TO CLOSTRIDIUM DIFFICILE, NOT SPECIFIED AS RECURRENT: ICD-10-CM

## 2018-10-17 DIAGNOSIS — Z86.69 PERSONAL HISTORY OF OTHER DISEASES OF THE NERVOUS SYSTEM AND SENSE ORGANS: ICD-10-CM

## 2018-10-17 DIAGNOSIS — M41.9 SCOLIOSIS, UNSPECIFIED: Chronic | ICD-10-CM

## 2018-10-17 DIAGNOSIS — Z09 ENCOUNTER FOR FOLLOW-UP EXAMINATION AFTER COMPLETED TREATMENT FOR CONDITIONS OTHER THAN MALIGNANT NEOPLASM: ICD-10-CM

## 2018-10-17 DIAGNOSIS — Z23 ENCOUNTER FOR IMMUNIZATION: ICD-10-CM

## 2018-10-17 DIAGNOSIS — R07.0 PAIN IN THROAT: ICD-10-CM

## 2018-10-17 DIAGNOSIS — Z90.49 ACQUIRED ABSENCE OF OTHER SPECIFIED PARTS OF DIGESTIVE TRACT: ICD-10-CM

## 2018-10-17 LAB
ALBUMIN SERPL ELPH-MCNC: 4.3 G/DL
ALP BLD-CCNC: 82 U/L
ALT SERPL-CCNC: 27 U/L
ANION GAP SERPL CALC-SCNC: 14 MMOL/L
AST SERPL-CCNC: 19 U/L
BILIRUB SERPL-MCNC: 0.4 MG/DL
BUN SERPL-MCNC: 11 MG/DL
CALCIUM SERPL-MCNC: 10.1 MG/DL
CHLORIDE SERPL-SCNC: 99 MMOL/L
CO2 SERPL-SCNC: 27 MMOL/L
CREAT SERPL-MCNC: 0.64 MG/DL
GLUCOSE SERPL-MCNC: 121 MG/DL
POTASSIUM SERPL-SCNC: 3.6 MMOL/L
PROT SERPL-MCNC: 7.6 G/DL
SODIUM SERPL-SCNC: 140 MMOL/L

## 2018-10-17 PROCEDURE — G0463: CPT

## 2018-10-17 RX ORDER — OXYCODONE 5 MG/1
5 TABLET ORAL
Qty: 30 | Refills: 0 | Status: DISCONTINUED | COMMUNITY
Start: 2018-10-03

## 2018-10-18 PROBLEM — Z90.49 S/P LAPAROSCOPIC CHOLECYSTECTOMY: Status: ACTIVE | Noted: 2018-10-17

## 2018-10-19 PROBLEM — J06.9 ACUTE URI: Status: RESOLVED | Noted: 2017-12-01 | Resolved: 2018-10-19

## 2018-10-19 NOTE — HEALTH RISK ASSESSMENT
[0] : 2) Feeling down, depressed, or hopeless: Not at all (0) [With Family] : lives with family [# of Members in Household ___] :  household currently consist of [unfilled] member(s) [Employed] : employed [] :  [# Of Children ___] : has [unfilled] children [Fully functional (bathing, dressing, toileting, transferring, walking, feeding)] : Fully functional (bathing, dressing, toileting, transferring, walking, feeding) [Fully functional (using the telephone, shopping, preparing meals, housekeeping, doing laundry, using] : Fully functional and needs no help or supervision to perform IADLs (using the telephone, shopping, preparing meals, housekeeping, doing laundry, using transportation, managing medications and managing finances) [] : No [[id number]] : 0 [Reports changes in hearing] : Reports no changes in hearing [Reports changes in vision] : Reports no changes in vision [MammogramDate] : 07/18 [MammogramComments] : repeat in 1 year [ColonoscopyDate] : 01/15 [ColonoscopyComments] : diverticulosis and internal hemorrhoids; repeat in 5 years  [HepatitisCDate] : 10/18 [HepatitisCComments] : Nonreactive  [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

## 2018-10-19 NOTE — PLAN
[FreeTextEntry1] : 58 yo F with PMH HTN recent hospital visit for pancreatitis s/p lap cholecystectomy (10/2) for gallstones. \par \par #Lap callie\par - healing well \par - c/w tylenol as needed \par \par #BP\par - C/w losartan-hctz 50-12.5 \par \par #Thalassemia: noted to have low MCV with low-normal H/H\par - son is carrier, sister is carrier, patient is likely carrier as well \par - can check electrophoresis once patient establishes insurance \par \par #HCM\par > Screenings\par - Cervical: doesn't recall her last one, none recorded, will order next visit \par - Mammo: next year\par - Colonoscopy: 2020\par > Immunizations\par - Flu shot: was given in hospital, Oct 2018 \par > Labs\par - will do all the labs after she has insurance, had recent labs from hospital and CMP from surg f/u\par - needs CBC, lipid panel, A1c, possibly electrophoresis for thalassemia   \par \par RTC in January for f/u visit\par \par Seen and discussed with Dr. Rainey \par \par Letty Patterson PGY1

## 2018-10-19 NOTE — REVIEW OF SYSTEMS
[Abdominal Pain] : abdominal pain [Patient Intake Form Reviewed] : Patient intake form was reviewed [Fever] : no fever [Chills] : no chills [Pain] : no pain [Vision Problems] : no vision problems [Sore Throat] : no sore throat [Chest Pain] : no chest pain [Shortness Of Breath] : no shortness of breath [Nausea] : no nausea [Diarrhea] : diarrhea [Vomiting] : no vomiting [Dysuria] : no dysuria [Joint Pain] : no joint pain [Muscle Pain] : no muscle pain [Skin Rash] : no skin rash [Headache] : no headache [Dizziness] : no dizziness [Insomnia] : no insomnia [Depression] : no depression [FreeTextEntry7] : RUQ pain but improves with tylenol

## 2018-10-19 NOTE — HISTORY OF PRESENT ILLNESS
[FreeTextEntry1] : 58 yo F here for new patient visit.  [de-identified] : 56 yo F with PMH HTN recent hospital visit for pancreatitis s/p cholecystectomy (10/2) for gallstones, here today for new patient visit and to apply for health insurance. She had been followed by Dr. Gregorio prior. She recently followed up with surgery on 10/15 for lap callie. Surgical wounds are healing well. Denies F/C/N/V/D/C. Had RUQ pain due to surgery but it pain is less now and alleviated with tylenol. Denies F/C/N/V/D/C. Patient reporting financial issues and without insurance currently (sliding scale), stressed about hospital bills.

## 2018-10-24 DIAGNOSIS — D56.9 THALASSEMIA, UNSPECIFIED: ICD-10-CM

## 2018-10-24 DIAGNOSIS — E78.5 HYPERLIPIDEMIA, UNSPECIFIED: ICD-10-CM

## 2018-10-24 DIAGNOSIS — Z90.49 ACQUIRED ABSENCE OF OTHER SPECIFIED PARTS OF DIGESTIVE TRACT: ICD-10-CM

## 2018-10-24 DIAGNOSIS — K90.49 MALABSORPTION DUE TO INTOLERANCE, NOT ELSEWHERE CLASSIFIED: ICD-10-CM

## 2018-12-13 ENCOUNTER — TRANSCRIPTION ENCOUNTER (OUTPATIENT)
Age: 58
End: 2018-12-13

## 2019-02-11 ENCOUNTER — RX RENEWAL (OUTPATIENT)
Age: 59
End: 2019-02-11

## 2019-02-13 ENCOUNTER — MEDICATION RENEWAL (OUTPATIENT)
Age: 59
End: 2019-02-13

## 2019-04-01 ENCOUNTER — APPOINTMENT (OUTPATIENT)
Dept: INTERNAL MEDICINE | Facility: CLINIC | Age: 59
End: 2019-04-01

## 2019-04-01 ENCOUNTER — OUTPATIENT (OUTPATIENT)
Dept: OUTPATIENT SERVICES | Facility: HOSPITAL | Age: 59
LOS: 1 days | End: 2019-04-01
Payer: SELF-PAY

## 2019-04-01 VITALS
BODY MASS INDEX: 20.61 KG/M2 | WEIGHT: 112 LBS | DIASTOLIC BLOOD PRESSURE: 70 MMHG | SYSTOLIC BLOOD PRESSURE: 122 MMHG | HEIGHT: 62 IN | OXYGEN SATURATION: 98 % | HEART RATE: 78 BPM

## 2019-04-01 DIAGNOSIS — R00.2 PALPITATIONS: ICD-10-CM

## 2019-04-01 DIAGNOSIS — I10 ESSENTIAL (PRIMARY) HYPERTENSION: ICD-10-CM

## 2019-04-01 DIAGNOSIS — M41.9 SCOLIOSIS, UNSPECIFIED: Chronic | ICD-10-CM

## 2019-04-01 PROCEDURE — G0463: CPT

## 2019-04-01 NOTE — HISTORY OF PRESENT ILLNESS
[FreeTextEntry1] : Need for blood work [de-identified] : 58-yo F w/ PMH of HTN, gallstone pancreatitis s/p cholecystectomy, presenting for blood work. Last visit Oct 2018 for CPE. Upon review of IM note on previous visit, patient is in need for mammogram and Pap smear, as well as blood work for thalassemia, A1C, lipid panel, and HCV. Patient opted out HIV. Patient states she was at one point deficient in vitamin D and would like to be checked today. Patient states that her son was a carrier of a blood disorder. Patient refuses mammogram and Pap smear for this visit. Of note, patient complains of intermittent palpitation without hair or skin changes, hot or cold sensation, or weight changes. Menopause almost 10 years ago.

## 2019-04-01 NOTE — REVIEW OF SYSTEMS
[Palpitations] : palpitations [Fever] : no fever [Chills] : no chills [Fatigue] : no fatigue [Hot Flashes] : no hot flashes [Discharge] : no discharge [Pain] : no pain [Redness] : no redness [Dryness] : no dryness  [Earache] : no earache [Hearing Loss] : no hearing loss [Nosebleed] : no nosebleeds [Sore Throat] : no sore throat [Chest Pain] : no chest pain [Leg Claudication] : no leg claudication [Lower Ext Edema] : no lower extremity edema [Shortness Of Breath] : no shortness of breath [Wheezing] : no wheezing [Cough] : no cough [Dyspnea on Exertion] : no dyspnea on exertion [Nausea] : no nausea [Constipation] : no constipation [Diarrhea] : diarrhea [Vomiting] : no vomiting [Melena] : no melena [Dysuria] : no dysuria [Vaginal Discharge] : no vaginal discharge [Muscle Weakness] : no muscle weakness [Muscle Pain] : no muscle pain [Itching] : no itching [Headache] : no headache [Dizziness] : no dizziness [Suicidal] : not suicidal [Easy Bleeding] : no easy bleeding [Swollen Glands] : no swollen glands

## 2019-04-01 NOTE — PHYSICAL EXAM
[No Acute Distress] : no acute distress [Well Nourished] : well nourished [Well Developed] : well developed [Well-Appearing] : well-appearing [Normal Sclera/Conjunctiva] : normal sclera/conjunctiva [PERRL] : pupils equal round and reactive to light [Normal Outer Ear/Nose] : the outer ears and nose were normal in appearance [Normal Oropharynx] : the oropharynx was normal [Supple] : supple [No Lymphadenopathy] : no lymphadenopathy [Thyroid Normal, No Nodules] : the thyroid was normal and there were no nodules present [No Respiratory Distress] : no respiratory distress  [Clear to Auscultation] : lungs were clear to auscultation bilaterally [No Accessory Muscle Use] : no accessory muscle use [Normal Rate] : normal rate  [Regular Rhythm] : with a regular rhythm [Normal S1, S2] : normal S1 and S2 [No Murmur] : no murmur heard [Pedal Pulses Present] : the pedal pulses are present [No Extremity Clubbing/Cyanosis] : no extremity clubbing/cyanosis [Soft] : abdomen soft [Non Tender] : non-tender [Non-distended] : non-distended [No Masses] : no abdominal mass palpated [No HSM] : no HSM [Normal Bowel Sounds] : normal bowel sounds [Normal Posterior Cervical Nodes] : no posterior cervical lymphadenopathy [Normal Anterior Cervical Nodes] : no anterior cervical lymphadenopathy [No CVA Tenderness] : no CVA  tenderness [No Spinal Tenderness] : no spinal tenderness [No Joint Swelling] : no joint swelling [Grossly Normal Strength/Tone] : grossly normal strength/tone [No Rash] : no rash [Normal Gait] : normal gait [Coordination Grossly Intact] : coordination grossly intact [No Focal Deficits] : no focal deficits [Normal Affect] : the affect was normal [Normal Insight/Judgement] : insight and judgment were intact

## 2019-04-04 DIAGNOSIS — E55.9 VITAMIN D DEFICIENCY, UNSPECIFIED: ICD-10-CM

## 2019-04-04 DIAGNOSIS — R00.2 PALPITATIONS: ICD-10-CM

## 2019-04-04 DIAGNOSIS — D56.9 THALASSEMIA, UNSPECIFIED: ICD-10-CM

## 2019-04-04 LAB
25(OH)D3 SERPL-MCNC: 25.1 NG/ML
ALBUMIN SERPL ELPH-MCNC: 4.6 G/DL
ALP BLD-CCNC: 62 U/L
ALT SERPL-CCNC: 22 U/L
ANION GAP SERPL CALC-SCNC: 13 MMOL/L
AST SERPL-CCNC: 19 U/L
BASOPHILS # BLD AUTO: 0.02 K/UL
BASOPHILS NFR BLD AUTO: 0.5 %
BILIRUB SERPL-MCNC: 0.4 MG/DL
BUN SERPL-MCNC: 13 MG/DL
CALCIUM SERPL-MCNC: 9.5 MG/DL
CHLORIDE SERPL-SCNC: 101 MMOL/L
CHOLEST SERPL-MCNC: 243 MG/DL
CHOLEST/HDLC SERPL: 3.1 RATIO
CO2 SERPL-SCNC: 28 MMOL/L
CREAT SERPL-MCNC: 0.56 MG/DL
EOSINOPHIL # BLD AUTO: 0.09 K/UL
EOSINOPHIL NFR BLD AUTO: 2.3 %
ESTIMATED AVERAGE GLUCOSE: 123 MG/DL
GLUCOSE SERPL-MCNC: 102 MG/DL
HBA1C MFR BLD HPLC: 5.9 %
HCT VFR BLD CALC: 38.9 %
HCV AB SER QL: NONREACTIVE
HCV S/CO RATIO: 0.13 S/CO
HDLC SERPL-MCNC: 78 MG/DL
HGB A MFR BLD: 97.9 %
HGB A2 MFR BLD: 2.1 %
HGB BLD-MCNC: 11.5 G/DL
HGB FRACT BLD-IMP: NORMAL
IMM GRANULOCYTES NFR BLD AUTO: 0.5 %
LDLC SERPL CALC-MCNC: 145 MG/DL
LYMPHOCYTES # BLD AUTO: 1.41 K/UL
LYMPHOCYTES NFR BLD AUTO: 35.6 %
MAN DIFF?: NORMAL
MCHC RBC-ENTMCNC: 21 PG
MCHC RBC-ENTMCNC: 29.6 GM/DL
MCV RBC AUTO: 71.1 FL
MONOCYTES # BLD AUTO: 0.26 K/UL
MONOCYTES NFR BLD AUTO: 6.6 %
NEUTROPHILS # BLD AUTO: 2.16 K/UL
NEUTROPHILS NFR BLD AUTO: 54.5 %
PLATELET # BLD AUTO: 308 K/UL
POTASSIUM SERPL-SCNC: 3.6 MMOL/L
PROT SERPL-MCNC: 7.1 G/DL
RBC # BLD: 5.47 M/UL
RBC # FLD: 15.9 %
SODIUM SERPL-SCNC: 142 MMOL/L
TRIGL SERPL-MCNC: 98 MG/DL
TSH SERPL-ACNC: 1.94 UIU/ML
WBC # FLD AUTO: 3.96 K/UL

## 2019-04-18 ENCOUNTER — APPOINTMENT (OUTPATIENT)
Age: 59
End: 2019-04-18

## 2019-06-13 ENCOUNTER — RX RENEWAL (OUTPATIENT)
Age: 59
End: 2019-06-13

## 2019-06-20 ENCOUNTER — TRANSCRIPTION ENCOUNTER (OUTPATIENT)
Age: 59
End: 2019-06-20

## 2019-06-21 ENCOUNTER — APPOINTMENT (OUTPATIENT)
Dept: INTERNAL MEDICINE | Facility: CLINIC | Age: 59
End: 2019-06-21

## 2019-06-21 ENCOUNTER — OUTPATIENT (OUTPATIENT)
Dept: OUTPATIENT SERVICES | Facility: HOSPITAL | Age: 59
LOS: 1 days | End: 2019-06-21
Payer: SELF-PAY

## 2019-06-21 VITALS
SYSTOLIC BLOOD PRESSURE: 102 MMHG | HEART RATE: 62 BPM | OXYGEN SATURATION: 97 % | HEIGHT: 62 IN | WEIGHT: 112 LBS | DIASTOLIC BLOOD PRESSURE: 60 MMHG | BODY MASS INDEX: 20.61 KG/M2

## 2019-06-21 DIAGNOSIS — R00.2 PALPITATIONS: ICD-10-CM

## 2019-06-21 DIAGNOSIS — M41.9 SCOLIOSIS, UNSPECIFIED: Chronic | ICD-10-CM

## 2019-06-21 DIAGNOSIS — M25.569 PAIN IN UNSPECIFIED KNEE: ICD-10-CM

## 2019-06-21 DIAGNOSIS — E55.9 VITAMIN D DEFICIENCY, UNSPECIFIED: ICD-10-CM

## 2019-06-21 DIAGNOSIS — I10 ESSENTIAL (PRIMARY) HYPERTENSION: ICD-10-CM

## 2019-06-21 DIAGNOSIS — T14.8XXA OTHER INJURY OF UNSPECIFIED BODY REGION, INITIAL ENCOUNTER: ICD-10-CM

## 2019-06-21 PROCEDURE — G0463: CPT

## 2019-06-21 NOTE — ASSESSMENT
[FreeTextEntry1] : 58-yo F w/ PMH of HTN, gallstone pancreatitis s/p cholecystectomy who is presenting for a f/u visit from urgent care where she presented after a fall\par \par # L knee pain\par - advised pt she does not need MRI; no effusion, no laxity, no pain on manipulation during physical exam.  Advised to call if she begins to develop any of these symptoms\par \par # Skin abrasion \par - advised to use topical Neosporin and keep covered w/ a band aid until healed \par \par Anju Dangelo MD\par Internal Medicine, PGY-1

## 2019-06-21 NOTE — PHYSICAL EXAM
[No Acute Distress] : no acute distress [Well Developed] : well developed [Well-Appearing] : well-appearing [EOMI] : extraocular movements intact [Normal Outer Ear/Nose] : the outer ears and nose were normal in appearance [No Respiratory Distress] : no respiratory distress  [Clear to Auscultation] : lungs were clear to auscultation bilaterally [No Accessory Muscle Use] : no accessory muscle use [Normal Rate] : normal rate  [Regular Rhythm] : with a regular rhythm [Normal S1, S2] : normal S1 and S2 [No Murmur] : no murmur heard [Soft] : abdomen soft [Non Tender] : non-tender [Non-distended] : non-distended [No Masses] : no abdominal mass palpated [Normal Bowel Sounds] : normal bowel sounds [No Joint Swelling] : no joint swelling [de-identified] : no pain on knee extension, flexion, vargus and varus stress test negative, normal strength/tone, no effusion, no erythema  [de-identified] : skin abrasion on L knee cap; no pus, healing well

## 2019-06-21 NOTE — HISTORY OF PRESENT ILLNESS
[FreeTextEntry8] : 58-yo F w/ PMH of HTN, gallstone pancreatitis s/p cholecystectomy who is presenting for a urgent care f/u visit.  Pt visited urgent care yeterday after falling forward at work due to her shoe catching on the floor. She had an x ray of her L knee  done at urgent care which revealed a small ossific donald proximal to the fibular, may represent a small Segond fracture or small biceps femoris avulsion; recommended F/U MRI.  Pt denied any popping sensation, has no gait instability or gait disturbance, there is no swelling of the knee joint; she reports having pain only from the skin abrasion.  She hasn't taken any pain medication and doesn't feel like she needs it.  \par \par

## 2019-07-02 DIAGNOSIS — T14.8XXA OTHER INJURY OF UNSPECIFIED BODY REGION, INITIAL ENCOUNTER: ICD-10-CM

## 2019-07-02 DIAGNOSIS — M25.569 PAIN IN UNSPECIFIED KNEE: ICD-10-CM

## 2019-07-16 NOTE — PHYSICAL EXAM
[No Acute Distress] : no acute distress [Well Nourished] : well nourished [Well Developed] : well developed [Well-Appearing] : well-appearing [Normal Sclera/Conjunctiva] : normal sclera/conjunctiva [PERRL] : pupils equal round and reactive to light [Normal Outer Ear/Nose] : the outer ears and nose were normal in appearance [Normal Oropharynx] : the oropharynx was normal [Supple] : supple [No Lymphadenopathy] : no lymphadenopathy [No Respiratory Distress] : no respiratory distress  [Clear to Auscultation] : lungs were clear to auscultation bilaterally [No Accessory Muscle Use] : no accessory muscle use [Normal Rate] : normal rate  [Regular Rhythm] : with a regular rhythm [Normal S1, S2] : normal S1 and S2 [Pedal Pulses Present] : the pedal pulses are present [No Edema] : there was no peripheral edema [Soft] : abdomen soft [Non-distended] : non-distended [No HSM] : no HSM [Normal Bowel Sounds] : normal bowel sounds [Scar] : a scar was noted [RUQ] : in the right upper quadrant [Periumbilical] : in the periumbilical area [Normal Posterior Cervical Nodes] : no posterior cervical lymphadenopathy Normal vision: sees adequately in most situations; can see medication labels, newsprint [Normal Anterior Cervical Nodes] : no anterior cervical lymphadenopathy [No Spinal Tenderness] : no spinal tenderness [No Joint Swelling] : no joint swelling [Grossly Normal Strength/Tone] : grossly normal strength/tone [No Rash] : no rash [Normal Gait] : normal gait [Coordination Grossly Intact] : coordination grossly intact [No Focal Deficits] : no focal deficits [Normal Affect] : the affect was normal [Normal Insight/Judgement] : insight and judgment were intact [No Varicosities] : no varicosities [Non Tender] : non-tender [de-identified] : Healing lap scars

## 2019-07-17 ENCOUNTER — APPOINTMENT (OUTPATIENT)
Dept: OPHTHALMOLOGY | Facility: CLINIC | Age: 59
End: 2019-07-17

## 2019-08-05 NOTE — ED ADULT NURSE NOTE - NS ED NOTE ABUSE SUSPICION NEGLECT YN
Lionel Mercedes [phone] (home)    is requesting refill(s) of medication Lisinopril to preferred pharmacy [id number] 6/24/19 (pertaining to medication)   Last refill 7/3/19 (per medication requested)  Next office visit scheduled or attempted No  Date   If No, reason Pt is not scheduled.
No

## 2019-10-08 ENCOUNTER — RX RENEWAL (OUTPATIENT)
Age: 59
End: 2019-10-08

## 2019-10-16 ENCOUNTER — RX RENEWAL (OUTPATIENT)
Age: 59
End: 2019-10-16

## 2019-10-28 RX ORDER — LOSARTAN POTASSIUM AND HYDROCHLOROTHIAZIDE 12.5; 5 MG/1; MG/1
50-12.5 TABLET ORAL
Qty: 30 | Refills: 3 | Status: DISCONTINUED | COMMUNITY
Start: 2017-12-01 | End: 2019-10-28

## 2019-11-25 ENCOUNTER — APPOINTMENT (OUTPATIENT)
Dept: INTERNAL MEDICINE | Facility: CLINIC | Age: 59
End: 2019-11-25

## 2019-11-25 ENCOUNTER — OUTPATIENT (OUTPATIENT)
Dept: OUTPATIENT SERVICES | Facility: HOSPITAL | Age: 59
LOS: 1 days | End: 2019-11-25
Payer: SELF-PAY

## 2019-11-25 DIAGNOSIS — D50.9 IRON DEFICIENCY ANEMIA, UNSPECIFIED: ICD-10-CM

## 2019-11-25 DIAGNOSIS — I10 ESSENTIAL (PRIMARY) HYPERTENSION: ICD-10-CM

## 2019-11-25 DIAGNOSIS — E55.9 VITAMIN D DEFICIENCY, UNSPECIFIED: ICD-10-CM

## 2019-11-25 DIAGNOSIS — Z23 ENCOUNTER FOR IMMUNIZATION: ICD-10-CM

## 2019-11-25 DIAGNOSIS — Z86.2 PERSONAL HISTORY OF DISEASES OF THE BLOOD AND BLOOD-FORMING ORGANS AND CERTAIN DISORDERS INVOLVING THE IMMUNE MECHANISM: ICD-10-CM

## 2019-11-25 DIAGNOSIS — M25.569 PAIN IN UNSPECIFIED KNEE: ICD-10-CM

## 2019-11-25 DIAGNOSIS — R73.09 OTHER ABNORMAL GLUCOSE: ICD-10-CM

## 2019-11-25 DIAGNOSIS — T14.8XXA OTHER INJURY OF UNSPECIFIED BODY REGION, INITIAL ENCOUNTER: ICD-10-CM

## 2019-11-25 DIAGNOSIS — E78.5 HYPERLIPIDEMIA, UNSPECIFIED: ICD-10-CM

## 2019-11-25 DIAGNOSIS — M41.9 SCOLIOSIS, UNSPECIFIED: Chronic | ICD-10-CM

## 2019-11-25 PROCEDURE — 90688 IIV4 VACCINE SPLT 0.5 ML IM: CPT

## 2019-11-25 PROCEDURE — G0463: CPT | Mod: 25

## 2019-11-25 PROCEDURE — G0008: CPT

## 2019-11-25 RX ORDER — LOSARTAN POTASSIUM 50 MG/1
50 TABLET, FILM COATED ORAL TWICE DAILY
Qty: 60 | Refills: 3 | Status: DISCONTINUED | COMMUNITY
Start: 2019-10-28 | End: 2019-11-25

## 2019-11-25 NOTE — COUNSELING
[Potential consequences of obesity discussed] : Potential consequences of obesity discussed [Encouraged to increase physical activity] : Encouraged to increase physical activity [Good understanding] : Patient has a good understanding of lifestyle changes and steps needed to achieve self management goal

## 2019-11-25 NOTE — PHYSICAL EXAM
[No Acute Distress] : no acute distress [Well Nourished] : well nourished [Well Developed] : well developed [Well-Appearing] : well-appearing [Normal Sclera/Conjunctiva] : normal sclera/conjunctiva [PERRL] : pupils equal round and reactive to light [EOMI] : extraocular movements intact [Normal Outer Ear/Nose] : the outer ears and nose were normal in appearance [Normal Oropharynx] : the oropharynx was normal [No Respiratory Distress] : no respiratory distress  [No Accessory Muscle Use] : no accessory muscle use [Clear to Auscultation] : lungs were clear to auscultation bilaterally [Normal Rate] : normal rate  [Regular Rhythm] : with a regular rhythm [Normal S1, S2] : normal S1 and S2 [No Murmur] : no murmur heard [Pedal Pulses Present] : the pedal pulses are present [No Edema] : there was no peripheral edema [Soft] : abdomen soft [Non Tender] : non-tender [Non-distended] : non-distended [No Masses] : no abdominal mass palpated [No HSM] : no HSM [Normal Bowel Sounds] : normal bowel sounds [Grossly Normal Strength/Tone] : grossly normal strength/tone [No Rash] : no rash [Coordination Grossly Intact] : coordination grossly intact [Normal Gait] : normal gait [Normal Affect] : the affect was normal [Normal Insight/Judgement] : insight and judgment were intact

## 2019-11-30 NOTE — HISTORY OF PRESENT ILLNESS
[de-identified] : 57 yo F with hx HTN, HLD, pre-DMT2, vitamin D deficiency and gallstone pancreatitis s/p cholecystectomy who presents for annual visit, with no complaints.\par \par Pt takes medications regularly. Takes HCTZ 12.5 mg qd and Losartan 50 mg once per day (expressed confusion from pharmacy instructions and educated to take this med once per day). ROS NEG. [FreeTextEntry1] : Annual visit, no complaints

## 2019-11-30 NOTE — HEALTH RISK ASSESSMENT
[Very Good] : ~his/her~ current health as very good [No] : No [0] : 2) Feeling down, depressed, or hopeless: Not at all (0) [Patient declined mammogram] : Patient declined mammogram [Patient reported colonoscopy was normal] : Patient reported colonoscopy was normal [HIV test declined] : HIV test declined [de-identified] : No exercise [] : No [de-identified] : N [[id number]] : 0 [MammogramDate] : 2018 [PapSmearDate] : Never [PapSmearComments] : Given referral [MammogramComments] : Deferred to 2020 [ColonoscopyDate] : 2015

## 2019-11-30 NOTE — PLAN
[FreeTextEntry1] : \par PLAN AS ABOVE IN ASSESSMENT.\par HCM: influenze vaccine administered today, referral for GYN pap smear given today, UTD on colonoscopy, deferred HIV, deferred mammogram until 2020.\par \par Case d/w Dr. Núñez.

## 2020-04-24 ENCOUNTER — APPOINTMENT (OUTPATIENT)
Dept: INTERNAL MEDICINE | Facility: CLINIC | Age: 60
End: 2020-04-24

## 2020-04-24 ENCOUNTER — OUTPATIENT (OUTPATIENT)
Dept: OUTPATIENT SERVICES | Facility: HOSPITAL | Age: 60
LOS: 1 days | End: 2020-04-24
Payer: SELF-PAY

## 2020-04-24 DIAGNOSIS — H60.90 UNSPECIFIED OTITIS EXTERNA, UNSPECIFIED EAR: ICD-10-CM

## 2020-04-24 DIAGNOSIS — M41.9 SCOLIOSIS, UNSPECIFIED: Chronic | ICD-10-CM

## 2020-04-24 PROCEDURE — G0463: CPT

## 2020-04-27 DIAGNOSIS — E55.9 VITAMIN D DEFICIENCY, UNSPECIFIED: ICD-10-CM

## 2020-04-27 DIAGNOSIS — I10 ESSENTIAL (PRIMARY) HYPERTENSION: ICD-10-CM

## 2020-04-27 DIAGNOSIS — R73.09 OTHER ABNORMAL GLUCOSE: ICD-10-CM

## 2020-04-27 DIAGNOSIS — E78.5 HYPERLIPIDEMIA, UNSPECIFIED: ICD-10-CM

## 2020-04-27 DIAGNOSIS — Z23 ENCOUNTER FOR IMMUNIZATION: ICD-10-CM

## 2020-04-27 DIAGNOSIS — D50.9 IRON DEFICIENCY ANEMIA, UNSPECIFIED: ICD-10-CM

## 2020-05-18 ENCOUNTER — APPOINTMENT (OUTPATIENT)
Dept: INTERNAL MEDICINE | Facility: CLINIC | Age: 60
End: 2020-05-18

## 2020-07-29 ENCOUNTER — OUTPATIENT (OUTPATIENT)
Dept: OUTPATIENT SERVICES | Facility: HOSPITAL | Age: 60
LOS: 1 days | End: 2020-07-29
Payer: SELF-PAY

## 2020-07-29 ENCOUNTER — APPOINTMENT (OUTPATIENT)
Dept: INTERNAL MEDICINE | Facility: CLINIC | Age: 60
End: 2020-07-29

## 2020-07-29 VITALS — HEART RATE: 75 BPM | DIASTOLIC BLOOD PRESSURE: 85 MMHG | SYSTOLIC BLOOD PRESSURE: 140 MMHG

## 2020-07-29 DIAGNOSIS — K21.9 GASTRO-ESOPHAGEAL REFLUX DISEASE WITHOUT ESOPHAGITIS: ICD-10-CM

## 2020-07-29 DIAGNOSIS — I10 ESSENTIAL (PRIMARY) HYPERTENSION: ICD-10-CM

## 2020-07-29 DIAGNOSIS — M41.9 SCOLIOSIS, UNSPECIFIED: Chronic | ICD-10-CM

## 2020-07-29 DIAGNOSIS — J30.2 OTHER SEASONAL ALLERGIC RHINITIS: ICD-10-CM

## 2020-07-29 PROCEDURE — G0463: CPT

## 2020-07-29 RX ORDER — CIPROFLOXACIN AND DEXAMETHASONE 3; 1 MG/ML; MG/ML
0.3-0.1 SUSPENSION/ DROPS AURICULAR (OTIC) TWICE DAILY
Qty: 1 | Refills: 0 | Status: DISCONTINUED | COMMUNITY
Start: 2020-04-24 | End: 2020-07-29

## 2020-07-29 RX ORDER — HYDROCHLOROTHIAZIDE 12.5 MG/1
12.5 CAPSULE ORAL DAILY
Qty: 60 | Refills: 2 | Status: DISCONTINUED | COMMUNITY
Start: 2019-10-28 | End: 2020-07-29

## 2020-07-29 RX ORDER — PANTOPRAZOLE 40 MG/1
40 TABLET, DELAYED RELEASE ORAL DAILY
Qty: 30 | Refills: 3 | Status: COMPLETED | COMMUNITY
Start: 2020-07-29 | End: 2020-11-26

## 2020-07-29 NOTE — PHYSICAL EXAM
[Normal Sclera/Conjunctiva] : normal sclera/conjunctiva [Well Developed] : well developed [No Acute Distress] : no acute distress [Normal Oropharynx] : the oropharynx was normal [EOMI] : extraocular movements intact [Thyroid Normal, No Nodules] : the thyroid was normal and there were no nodules present [No Lymphadenopathy] : no lymphadenopathy [No Respiratory Distress] : no respiratory distress  [Clear to Auscultation] : lungs were clear to auscultation bilaterally [Normal Rate] : normal rate  [Regular Rhythm] : with a regular rhythm [Normal S1, S2] : normal S1 and S2 [No Edema] : there was no peripheral edema [Soft] : abdomen soft [Non-distended] : non-distended [Non Tender] : non-tender [Normal Affect] : the affect was normal

## 2020-07-30 NOTE — REVIEW OF SYSTEMS
[Dryness] : dryness  [Heartburn] : heartburn [Chills] : no chills [Fever] : no fever [Earache] : no earache [Nasal Discharge] : no nasal discharge [Chest Pain] : no chest pain [Shortness Of Breath] : no shortness of breath [Palpitations] : no palpitations [Cough] : no cough [Dysuria] : no dysuria [Vomiting] : no vomiting [Abdominal Pain] : no abdominal pain [Hematuria] : no hematuria [Joint Pain] : no joint pain [Itching] : no itching [Muscle Pain] : no muscle pain [Skin Rash] : no skin rash

## 2020-07-30 NOTE — HISTORY OF PRESENT ILLNESS
[FreeTextEntry1] : Acid reflux and blood pressure medications [de-identified] : 57 yo F with hx HTN, HLD, pre-DMT2, vitamin D deficiency presenting with discomfort after eating and confusion over blood pressure meds. Pt was originally prescribed losartan 50mg and HCTZ 12.5mg. She had some confusion in Nov 2019 and was taking losartan 50mg twice. She was then educated to take it once a day with the HCTZ 12.5mg. Last month pt started having low blood pressures at home (low 100s systolic) so without discussion with the clinic, stopped taking the HCTZ and started cutting in half the losartan 50mg pill and taking one half in the morning and one half at nighttime. She reports having high blood pressure readings during the day now, sometimes systolic 140-150. She wants to know which dosage to take now since her BP is increasing. She no longer has low BP readings.\par \par Pt is also requesting medication for seasonal allergies. She tends to get headaches and dry eyes during this time of the year but does not know which OTC to take.\par She also has a hx of GERD and says that she feels this type of pain after eating. Usually a burning fullness from upper abdomen to middle of chest. She is requesting a medication to help control her GERD symptoms.

## 2020-09-01 ENCOUNTER — LABORATORY RESULT (OUTPATIENT)
Age: 60
End: 2020-09-01

## 2020-09-01 ENCOUNTER — OUTPATIENT (OUTPATIENT)
Dept: OUTPATIENT SERVICES | Facility: HOSPITAL | Age: 60
LOS: 1 days | End: 2020-09-01
Payer: SELF-PAY

## 2020-09-01 ENCOUNTER — APPOINTMENT (OUTPATIENT)
Dept: INTERNAL MEDICINE | Facility: CLINIC | Age: 60
End: 2020-09-01

## 2020-09-01 VITALS
WEIGHT: 115 LBS | SYSTOLIC BLOOD PRESSURE: 130 MMHG | HEIGHT: 62 IN | DIASTOLIC BLOOD PRESSURE: 80 MMHG | BODY MASS INDEX: 21.16 KG/M2

## 2020-09-01 DIAGNOSIS — R73.03 PREDIABETES: ICD-10-CM

## 2020-09-01 DIAGNOSIS — Z23 ENCOUNTER FOR IMMUNIZATION: ICD-10-CM

## 2020-09-01 DIAGNOSIS — E55.9 VITAMIN D DEFICIENCY, UNSPECIFIED: ICD-10-CM

## 2020-09-01 DIAGNOSIS — K21.9 GASTRO-ESOPHAGEAL REFLUX DISEASE WITHOUT ESOPHAGITIS: ICD-10-CM

## 2020-09-01 DIAGNOSIS — M41.9 SCOLIOSIS, UNSPECIFIED: Chronic | ICD-10-CM

## 2020-09-01 PROCEDURE — 90688 IIV4 VACCINE SPLT 0.5 ML IM: CPT

## 2020-09-01 PROCEDURE — 82306 VITAMIN D 25 HYDROXY: CPT

## 2020-09-01 PROCEDURE — 80061 LIPID PANEL: CPT

## 2020-09-01 PROCEDURE — G0008: CPT

## 2020-09-01 PROCEDURE — 80048 BASIC METABOLIC PNL TOTAL CA: CPT

## 2020-09-01 PROCEDURE — 83036 HEMOGLOBIN GLYCOSYLATED A1C: CPT

## 2020-09-01 PROCEDURE — G0463: CPT | Mod: 25

## 2020-09-01 PROCEDURE — 36415 COLL VENOUS BLD VENIPUNCTURE: CPT

## 2020-09-01 RX ORDER — CETIRIZINE HYDROCHLORIDE 10 MG/1
10 TABLET, FILM COATED ORAL DAILY
Qty: 30 | Refills: 1 | Status: DISCONTINUED | COMMUNITY
Start: 2020-07-29 | End: 2020-09-01

## 2020-09-01 NOTE — PHYSICAL EXAM
[No Acute Distress] : no acute distress [Well-Appearing] : well-appearing [Normal Sclera/Conjunctiva] : normal sclera/conjunctiva [Normal Outer Ear/Nose] : the outer ears and nose were normal in appearance [Normal Oropharynx] : the oropharynx was normal [No JVD] : no jugular venous distention [No Lymphadenopathy] : no lymphadenopathy [Supple] : supple [No Respiratory Distress] : no respiratory distress  [No Accessory Muscle Use] : no accessory muscle use [Clear to Auscultation] : lungs were clear to auscultation bilaterally [Normal Rate] : normal rate  [Regular Rhythm] : with a regular rhythm [Normal S1, S2] : normal S1 and S2 [No Murmur] : no murmur heard [No Edema] : there was no peripheral edema [Soft] : abdomen soft [Non Tender] : non-tender [Non-distended] : non-distended [Normal Bowel Sounds] : normal bowel sounds [Normal Posterior Cervical Nodes] : no posterior cervical lymphadenopathy [Normal Anterior Cervical Nodes] : no anterior cervical lymphadenopathy [No Spinal Tenderness] : no spinal tenderness [No Joint Swelling] : no joint swelling [Grossly Normal Strength/Tone] : grossly normal strength/tone [No Rash] : no rash [Coordination Grossly Intact] : coordination grossly intact [No Focal Deficits] : no focal deficits [Normal Gait] : normal gait [Normal Affect] : the affect was normal [Normal Insight/Judgement] : insight and judgment were intact

## 2020-09-02 NOTE — HISTORY OF PRESENT ILLNESS
[FreeTextEntry1] : 59y F presents for follow up. [de-identified] : 59y F w/ PMH w/ HTN, prediabetes, vitamin D insufficiency, GERD presents for routine follow up. Reports having been well. Her only health concern today is checking blood work. Home BP generally 120s/ 60-70s. 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

## 2020-09-02 NOTE — REVIEW OF SYSTEMS
[Chills] : no chills [Fever] : no fever [Vision Problems] : no vision problems [Nasal Discharge] : no nasal discharge [Pain] : no pain [Redness] : no redness [Palpitations] : no palpitations [Chest Pain] : no chest pain [Sore Throat] : no sore throat [Shortness Of Breath] : no shortness of breath [Lower Ext Edema] : no lower extremity edema [Wheezing] : no wheezing [Cough] : no cough [Abdominal Pain] : no abdominal pain [Nausea] : no nausea [Constipation] : no constipation [Diarrhea] : diarrhea [Vomiting] : no vomiting [Dysuria] : no dysuria [Incontinence] : no incontinence [Hematuria] : no hematuria [Joint Pain] : no joint pain [Itching] : no itching [Joint Swelling] : no joint swelling [Dizziness] : no dizziness [Headache] : no headache [Skin Rash] : no skin rash [Depression] : no depression [Easy Bleeding] : no easy bleeding [Anxiety] : no anxiety [Easy Bruising] : no easy bruising

## 2020-09-02 NOTE — PLAN
[FreeTextEntry1] : \par #HTN\par - /80\par - c/w losartan 50\par - check BMP\par \par #Prediabetes\par - a1c 5.9 in 4/2019\par - check serum a1c\par - discussed diet and lifestyle modifications\par \par #Vitamin D insufficiency\par - was deficient to a 25 [place] vitamin D of 18 in 2017, improved to 25 in 4/2019 with supplementation\par - has been on vitamin D 1000IU almost daily\par - check repeat vit D level\par \par #GERD\par - prior heartburn symptoms resolved\par - will try to taper off protonix from 40 to every other day for 1-2 weeks then dc\par - discussed lifestyle modifications\par - can use antacids, pepcid PRN\par \par #HCM\par - will check lipid profile today (patient ate a bagel 3 hours ago)\par - flu shot given today\par - tdap given 9/2016\par - patient deferred pap testing for now\par - mammo birads 1 in 7/2018, patient deferred mammogram to next year\par - negative screening colonoscopy (but had internal hemorrhoids and mild diverticulosis) in 1/2015, was recommended repeat in 5 years by GI; patient wants a longer interval of 10 years

## 2020-11-13 ENCOUNTER — APPOINTMENT (OUTPATIENT)
Dept: INTERNAL MEDICINE | Facility: CLINIC | Age: 60
End: 2020-11-13

## 2020-11-30 ENCOUNTER — TRANSCRIPTION ENCOUNTER (OUTPATIENT)
Age: 60
End: 2020-11-30

## 2020-12-22 ENCOUNTER — NON-APPOINTMENT (OUTPATIENT)
Age: 60
End: 2020-12-22

## 2020-12-23 ENCOUNTER — APPOINTMENT (OUTPATIENT)
Dept: INTERNAL MEDICINE | Facility: CLINIC | Age: 60
End: 2020-12-23

## 2021-04-07 ENCOUNTER — NON-APPOINTMENT (OUTPATIENT)
Age: 61
End: 2021-04-07

## 2021-05-03 ENCOUNTER — NON-APPOINTMENT (OUTPATIENT)
Age: 61
End: 2021-05-03

## 2021-05-03 ENCOUNTER — APPOINTMENT (OUTPATIENT)
Dept: INTERNAL MEDICINE | Facility: CLINIC | Age: 61
End: 2021-05-03
Payer: COMMERCIAL

## 2021-05-03 VITALS
TEMPERATURE: 97.8 F | HEIGHT: 61 IN | HEART RATE: 89 BPM | WEIGHT: 113.8 LBS | BODY MASS INDEX: 21.49 KG/M2 | SYSTOLIC BLOOD PRESSURE: 144 MMHG | OXYGEN SATURATION: 99 % | DIASTOLIC BLOOD PRESSURE: 100 MMHG

## 2021-05-03 VITALS — SYSTOLIC BLOOD PRESSURE: 142 MMHG | DIASTOLIC BLOOD PRESSURE: 92 MMHG

## 2021-05-03 DIAGNOSIS — Z12.39 ENCOUNTER FOR OTHER SCREENING FOR MALIGNANT NEOPLASM OF BREAST: ICD-10-CM

## 2021-05-03 DIAGNOSIS — Z56.0 UNEMPLOYMENT, UNSPECIFIED: ICD-10-CM

## 2021-05-03 DIAGNOSIS — Z11.59 ENCOUNTER FOR SCREENING FOR OTHER VIRAL DISEASES: ICD-10-CM

## 2021-05-03 DIAGNOSIS — J30.2 OTHER SEASONAL ALLERGIC RHINITIS: ICD-10-CM

## 2021-05-03 PROCEDURE — 99072 ADDL SUPL MATRL&STAF TM PHE: CPT

## 2021-05-03 PROCEDURE — 93000 ELECTROCARDIOGRAM COMPLETE: CPT | Mod: 59

## 2021-05-03 PROCEDURE — G0444 DEPRESSION SCREEN ANNUAL: CPT

## 2021-05-03 PROCEDURE — 99386 PREV VISIT NEW AGE 40-64: CPT | Mod: 25

## 2021-05-03 RX ORDER — ACETAMINOPHEN 325 MG/1
TABLET, FILM COATED ORAL EVERY 6 HOURS
Refills: 0 | Status: DISCONTINUED | COMMUNITY
End: 2021-05-03

## 2021-05-03 SDOH — ECONOMIC STABILITY - INCOME SECURITY: UNEMPLOYMENT, UNSPECIFIED: Z56.0

## 2021-05-03 NOTE — REVIEW OF SYSTEMS
[Negative] : Heme/Lymph [Heartburn] : heartburn [Abdominal Pain] : no abdominal pain [Nausea] : no nausea [Constipation] : no constipation [Diarrhea] : diarrhea [Vomiting] : no vomiting [Melena] : no melena

## 2021-05-03 NOTE — PAST MEDICAL HISTORY
[FreeTextEntry1] : I have personally reviewed her pre op and post-op imaging
[Postmenopausal] : postmenopausal

## 2021-05-03 NOTE — HISTORY OF PRESENT ILLNESS
[de-identified] : 61 y/o woman presents for annual physical exam. first visit in this office in over 3 years. she had issues with her insurance and was following at a different practice for past few years. \par her past hx and current rx and issues are reviewed in detail \par \par she has bothersome trigger finger of R hand middle finger. mild swelling and pain in the morning \par \par recurrent GERD symptoms after she stopped pantoprazole high doses prescribed this past year. chronic GERD noted \par \par mild IFG on diet control, monitoring mild Hgba1c elevation \par \par HTN on losartan 50 mg qd, BP uncontrolled today. compliant w rx, she is somewhat anxious in the office\par \par last mammography 2018, she is now willing to have repeat screening mammography \par \par last colonoscopy 2015, repeat 5 years advised \par \par she is considering COVID vaccine but has not decided yet \par

## 2021-05-03 NOTE — ASSESSMENT
[FreeTextEntry1] : discussed w pt \par \par reviewed current rx \par \par HTN uncontrolled today, compliant w rx. she is anxious in the office and she erports BP normal when monitoring at home. advised low Na intake, cont current rx, monitor BP  at home and record, f/u to reevaluate \par \par check routine labs as below \par \par routine diet, exercise advised \par \par advised GYN screening, and mammography overdue, ordered \par \par suggested repeat colonoscopy this year, she prefers to defer this \par \par will restart lower dose PPI for chronic GERD, advised on potential risks, she is symptomatic, consider GI eval \par \par reviewed vaccinations. she will consider COVID vaccine in near future, consider SHingrix vaccine few months later \par \par she prefers to monitor trigger finger for now \par \par RTO 3-4 weeks to reevaluate BP, home readings to be reviewed, cont current rx \par \par

## 2021-05-03 NOTE — HEALTH RISK ASSESSMENT
[No] : In the past 12 months have you used drugs other than those required for medical reasons? No [Patient reported mammogram was normal] : Patient reported mammogram was normal [None] : None [] :  [] : No [MammogramDate] : 07/18 [ColonoscopyDate] : 01/15

## 2021-05-21 ENCOUNTER — OUTPATIENT (OUTPATIENT)
Dept: OUTPATIENT SERVICES | Facility: HOSPITAL | Age: 61
LOS: 1 days | End: 2021-05-21
Payer: COMMERCIAL

## 2021-05-21 ENCOUNTER — RESULT REVIEW (OUTPATIENT)
Age: 61
End: 2021-05-21

## 2021-05-21 ENCOUNTER — APPOINTMENT (OUTPATIENT)
Dept: MAMMOGRAPHY | Facility: IMAGING CENTER | Age: 61
End: 2021-05-21
Payer: COMMERCIAL

## 2021-05-21 DIAGNOSIS — M41.9 SCOLIOSIS, UNSPECIFIED: Chronic | ICD-10-CM

## 2021-05-21 DIAGNOSIS — Z00.8 ENCOUNTER FOR OTHER GENERAL EXAMINATION: ICD-10-CM

## 2021-05-21 PROCEDURE — 77063 BREAST TOMOSYNTHESIS BI: CPT | Mod: 26

## 2021-05-21 PROCEDURE — 77067 SCR MAMMO BI INCL CAD: CPT | Mod: 26

## 2021-05-21 PROCEDURE — 77063 BREAST TOMOSYNTHESIS BI: CPT

## 2021-05-21 PROCEDURE — 77067 SCR MAMMO BI INCL CAD: CPT

## 2021-06-29 ENCOUNTER — RX RENEWAL (OUTPATIENT)
Age: 61
End: 2021-06-29

## 2021-07-14 ENCOUNTER — APPOINTMENT (OUTPATIENT)
Dept: INTERNAL MEDICINE | Facility: CLINIC | Age: 61
End: 2021-07-14

## 2021-07-27 ENCOUNTER — APPOINTMENT (OUTPATIENT)
Dept: INTERNAL MEDICINE | Facility: CLINIC | Age: 61
End: 2021-07-27
Payer: COMMERCIAL

## 2021-07-27 VITALS
OXYGEN SATURATION: 96 % | HEIGHT: 61 IN | TEMPERATURE: 97.34 F | WEIGHT: 116 LBS | DIASTOLIC BLOOD PRESSURE: 86 MMHG | SYSTOLIC BLOOD PRESSURE: 136 MMHG | HEART RATE: 99 BPM | BODY MASS INDEX: 21.9 KG/M2

## 2021-07-27 LAB
25(OH)D3 SERPL-MCNC: 29.4 NG/ML
ALBUMIN SERPL ELPH-MCNC: 4.5 G/DL
ALP BLD-CCNC: 80 U/L
ALT SERPL-CCNC: 32 U/L
ANION GAP SERPL CALC-SCNC: 14 MMOL/L
APPEARANCE: CLEAR
AST SERPL-CCNC: 26 U/L
BASOPHILS # BLD AUTO: 0.02 K/UL
BASOPHILS NFR BLD AUTO: 0.4 %
BILIRUB SERPL-MCNC: 0.5 MG/DL
BILIRUBIN URINE: NEGATIVE
BLOOD URINE: NEGATIVE
BUN SERPL-MCNC: 10 MG/DL
CALCIUM SERPL-MCNC: 10.2 MG/DL
CHLORIDE SERPL-SCNC: 103 MMOL/L
CHOLEST SERPL-MCNC: 264 MG/DL
CO2 SERPL-SCNC: 24 MMOL/L
COLOR: NORMAL
COVID-19 NUCLEOCAPSID  GAM ANTIBODY INTERPRETATION: NEGATIVE
CREAT SERPL-MCNC: 0.6 MG/DL
EOSINOPHIL # BLD AUTO: 0.24 K/UL
EOSINOPHIL NFR BLD AUTO: 5 %
ESTIMATED AVERAGE GLUCOSE: 123 MG/DL
GLUCOSE QUALITATIVE U: NEGATIVE
GLUCOSE SERPL-MCNC: 100 MG/DL
HBA1C MFR BLD HPLC: 5.9 %
HCT VFR BLD CALC: 40.7 %
HDLC SERPL-MCNC: 66 MG/DL
HGB BLD-MCNC: 12.2 G/DL
IMM GRANULOCYTES NFR BLD AUTO: 0.6 %
KETONES URINE: NEGATIVE
LDLC SERPL CALC-MCNC: 175 MG/DL
LEUKOCYTE ESTERASE URINE: NEGATIVE
LYMPHOCYTES # BLD AUTO: 1.53 K/UL
LYMPHOCYTES NFR BLD AUTO: 31.9 %
MAN DIFF?: NORMAL
MCHC RBC-ENTMCNC: 21.1 PG
MCHC RBC-ENTMCNC: 30 GM/DL
MCV RBC AUTO: 70.5 FL
MONOCYTES # BLD AUTO: 0.38 K/UL
MONOCYTES NFR BLD AUTO: 7.9 %
NEUTROPHILS # BLD AUTO: 2.6 K/UL
NEUTROPHILS NFR BLD AUTO: 54.2 %
NITRITE URINE: NEGATIVE
NONHDLC SERPL-MCNC: 199 MG/DL
PH URINE: 6
PLATELET # BLD AUTO: 313 K/UL
POTASSIUM SERPL-SCNC: 3.7 MMOL/L
PROT SERPL-MCNC: 7.5 G/DL
PROTEIN URINE: NEGATIVE
RBC # BLD: 5.77 M/UL
RBC # FLD: 16.5 %
SARS-COV-2 AB SERPL QL IA: 0.09 INDEX
SODIUM SERPL-SCNC: 141 MMOL/L
SPECIFIC GRAVITY URINE: 1.01
T4 FREE SERPL-MCNC: 1.4 NG/DL
TRIGL SERPL-MCNC: 116 MG/DL
TSH SERPL-ACNC: 1.65 UIU/ML
UROBILINOGEN URINE: NORMAL
WBC # FLD AUTO: 4.8 K/UL

## 2021-07-27 PROCEDURE — 99214 OFFICE O/P EST MOD 30 MIN: CPT

## 2021-07-27 NOTE — PHYSICAL EXAM
[No Acute Distress] : no acute distress [Well-Appearing] : well-appearing [Normal Voice/Communication] : normal voice/communication [No Lymphadenopathy] : no lymphadenopathy [Normal] : normal rate, regular rhythm, normal S1 and S2 and no murmur heard [Normal Gait] : normal gait [Speech Grossly Normal] : speech grossly normal [Normal Affect] : the affect was normal [Alert and Oriented x3] : oriented to person, place, and time [Normal Mood] : the mood was normal [Normal Insight/Judgement] : insight and judgment were intact

## 2021-07-29 NOTE — HISTORY OF PRESENT ILLNESS
[de-identified] : presents for f/u visit for review of current rx, chronic medical issues. she feels well overall. \par \par HTN fairly controlled on losartan, HCTZ, compliant w rx. BP monitored at home. she did not take HCTZ for few weeks, recently restarted \par \par hyperlipidemia likely familial, unchanged for years. she has declined statin tx \par \par mild IFG stable on diet control, Hgba1c 5.9% \par \par GERD now controlled on omeprazole since restarting rx \par \par since last visit she had COVID vaccines x 2 doses , Pfizer in may and june, no concerns

## 2021-07-29 NOTE — ASSESSMENT
[FreeTextEntry1] : discussed w pt \par \par cont current rx\par \par reviewed most recent labs \par \par counseled on hyperlipidemia, she is not ready to start statin therapy. likely familial hyperlipidemia. she will consider for the future \par \par advised on diet control , exercise \par \par she will monitor BP at home, low Na intake advised. cont current rx \par \par she is UTD w mammography in 5/21\par \par RTO 3-4 months for f/u or earlier prn if any new concerns

## 2021-09-03 ENCOUNTER — RX RENEWAL (OUTPATIENT)
Age: 61
End: 2021-09-03

## 2021-10-25 ENCOUNTER — APPOINTMENT (OUTPATIENT)
Dept: INTERNAL MEDICINE | Facility: CLINIC | Age: 61
End: 2021-10-25
Payer: COMMERCIAL

## 2021-10-25 VITALS
HEART RATE: 79 BPM | OXYGEN SATURATION: 99 % | WEIGHT: 116 LBS | HEIGHT: 61 IN | SYSTOLIC BLOOD PRESSURE: 126 MMHG | DIASTOLIC BLOOD PRESSURE: 80 MMHG | BODY MASS INDEX: 21.9 KG/M2

## 2021-10-25 DIAGNOSIS — Z01.818 ENCOUNTER FOR OTHER PREPROCEDURAL EXAMINATION: ICD-10-CM

## 2021-10-25 DIAGNOSIS — H26.9 UNSPECIFIED CATARACT: ICD-10-CM

## 2021-10-25 PROCEDURE — 99214 OFFICE O/P EST MOD 30 MIN: CPT

## 2021-10-25 NOTE — PLAN
[FreeTextEntry1] : discussed w pt \par \par no contraindications to the planned low risk surgery as scheduled \par \par cont current rx \par \par please call with any questions \par \par RTO 6 months for routine f/u or earlier prn if any new concerns

## 2021-10-25 NOTE — ASSESSMENT
[Patient Optimized for Surgery] : Patient optimized for surgery [No Further Testing Recommended] : no further testing recommended [Continue medications as is] : Continue current medications [FreeTextEntry2] : is not required

## 2021-10-25 NOTE — HISTORY OF PRESENT ILLNESS
[No Pertinent Cardiac History] : no history of aortic stenosis, atrial fibrillation, coronary artery disease, recent myocardial infarction, or implantable device/pacemaker [No Pertinent Pulmonary History] : no history of asthma, COPD, sleep apnea, or smoking [No Adverse Anesthesia Reaction] : no adverse anesthesia reaction in self or family member [(Patient denies any chest pain, claudication, dyspnea on exertion, orthopnea, palpitations or syncope)] : Patient denies any chest pain, claudication, dyspnea on exertion, orthopnea, palpitations or syncope [Good (7-10 METs)] : Good (7-10 METs) [Chronic Anticoagulation] : no chronic anticoagulation [Chronic Kidney Disease] : no chronic kidney disease [FreeTextEntry1] : - L eye cataract extraction [FreeTextEntry2] : 11/4/21 [FreeTextEntry3] : - Dr Torey Brink - fax # [phone] [FreeTextEntry4] : \par presents for medical evaluation prior to planned L eye cataract surgery. feeling well currently, no new concerns. no recent illnesses. \par she had COVID vaccines x 2 doses. \par \par HTN controlled on current rx \par GERD controlled on PPI

## 2021-10-25 NOTE — PHYSICAL EXAM
[No Acute Distress] : no acute distress [Well-Appearing] : well-appearing [Normal Voice/Communication] : normal voice/communication [No Lymphadenopathy] : no lymphadenopathy [Normal] : normal rate, regular rhythm, normal S1 and S2 and no murmur heard [No Carotid Bruits] : no carotid bruits [Pedal Pulses Present] : the pedal pulses are present [No Edema] : there was no peripheral edema [Normal Gait] : normal gait [Normal Affect] : the affect was normal [Alert and Oriented x3] : oriented to person, place, and time [Normal Mood] : the mood was normal [Normal Insight/Judgement] : insight and judgment were intact

## 2021-12-04 ENCOUNTER — RX RENEWAL (OUTPATIENT)
Age: 61
End: 2021-12-04

## 2022-02-01 ENCOUNTER — RX RENEWAL (OUTPATIENT)
Age: 62
End: 2022-02-01

## 2022-04-05 ENCOUNTER — APPOINTMENT (OUTPATIENT)
Dept: DERMATOLOGY | Facility: CLINIC | Age: 62
End: 2022-04-05
Payer: COMMERCIAL

## 2022-04-05 DIAGNOSIS — L82.1 OTHER SEBORRHEIC KERATOSIS: ICD-10-CM

## 2022-04-05 DIAGNOSIS — L72.0 EPIDERMAL CYST: ICD-10-CM

## 2022-04-05 DIAGNOSIS — L98.8 OTHER SPECIFIED DISORDERS OF THE SKIN AND SUBCUTANEOUS TISSUE: ICD-10-CM

## 2022-04-05 PROCEDURE — 99203 OFFICE O/P NEW LOW 30 MIN: CPT

## 2022-04-05 RX ORDER — TRETINOIN 0.25 MG/G
0.03 CREAM TOPICAL
Qty: 1 | Refills: 6 | Status: ACTIVE | COMMUNITY
Start: 2022-04-05 | End: 1900-01-01

## 2022-04-11 PROBLEM — Z11.59 SCREENING FOR VIRAL DISEASE: Status: ACTIVE | Noted: 2021-05-03

## 2022-04-28 ENCOUNTER — RX RENEWAL (OUTPATIENT)
Age: 62
End: 2022-04-28

## 2022-05-25 ENCOUNTER — APPOINTMENT (OUTPATIENT)
Dept: INTERNAL MEDICINE | Facility: CLINIC | Age: 62
End: 2022-05-25

## 2022-05-25 VITALS
TEMPERATURE: 98.7 F | BODY MASS INDEX: 21.9 KG/M2 | HEIGHT: 61 IN | HEART RATE: 78 BPM | OXYGEN SATURATION: 98 % | SYSTOLIC BLOOD PRESSURE: 124 MMHG | WEIGHT: 116 LBS | DIASTOLIC BLOOD PRESSURE: 76 MMHG

## 2022-05-25 PROCEDURE — 99213 OFFICE O/P EST LOW 20 MIN: CPT | Mod: 25

## 2022-05-31 ENCOUNTER — LABORATORY RESULT (OUTPATIENT)
Age: 62
End: 2022-05-31

## 2022-06-28 ENCOUNTER — RX RENEWAL (OUTPATIENT)
Age: 62
End: 2022-06-28

## 2022-07-01 ENCOUNTER — APPOINTMENT (OUTPATIENT)
Dept: INTERNAL MEDICINE | Facility: CLINIC | Age: 62
End: 2022-07-01

## 2022-07-01 PROCEDURE — 36415 COLL VENOUS BLD VENIPUNCTURE: CPT

## 2022-07-05 LAB
25(OH)D3 SERPL-MCNC: 32.5 NG/ML
ALBUMIN SERPL ELPH-MCNC: 4.6 G/DL
ALP BLD-CCNC: 64 U/L
ALT SERPL-CCNC: 28 U/L
ANION GAP SERPL CALC-SCNC: 13 MMOL/L
APPEARANCE: CLEAR
AST SERPL-CCNC: 20 U/L
BASOPHILS # BLD AUTO: 0.02 K/UL
BASOPHILS NFR BLD AUTO: 0.4 %
BILIRUB SERPL-MCNC: 0.5 MG/DL
BILIRUBIN URINE: NEGATIVE
BLOOD URINE: NEGATIVE
BUN SERPL-MCNC: 12 MG/DL
CALCIUM SERPL-MCNC: 9.4 MG/DL
CHLORIDE SERPL-SCNC: 102 MMOL/L
CHOLEST SERPL-MCNC: 263 MG/DL
CO2 SERPL-SCNC: 24 MMOL/L
COLOR: YELLOW
CREAT SERPL-MCNC: 0.59 MG/DL
EGFR: 102 ML/MIN/1.73M2
EOSINOPHIL # BLD AUTO: 0.1 K/UL
EOSINOPHIL NFR BLD AUTO: 2.1 %
ESTIMATED AVERAGE GLUCOSE: 128 MG/DL
GLUCOSE QUALITATIVE U: NEGATIVE
GLUCOSE SERPL-MCNC: 103 MG/DL
HBA1C MFR BLD HPLC: 6.1 %
HCT VFR BLD CALC: 41.4 %
HDLC SERPL-MCNC: 54 MG/DL
HGB BLD-MCNC: 12.1 G/DL
IMM GRANULOCYTES NFR BLD AUTO: 0.2 %
KETONES URINE: NEGATIVE
LDLC SERPL CALC-MCNC: 175 MG/DL
LEUKOCYTE ESTERASE URINE: NEGATIVE
LYMPHOCYTES # BLD AUTO: 2.19 K/UL
LYMPHOCYTES NFR BLD AUTO: 46.8 %
MAN DIFF?: NORMAL
MCHC RBC-ENTMCNC: 21.2 PG
MCHC RBC-ENTMCNC: 29.2 GM/DL
MCV RBC AUTO: 72.5 FL
MONOCYTES # BLD AUTO: 0.33 K/UL
MONOCYTES NFR BLD AUTO: 7.1 %
NEUTROPHILS # BLD AUTO: 2.03 K/UL
NEUTROPHILS NFR BLD AUTO: 43.4 %
NITRITE URINE: NEGATIVE
NONHDLC SERPL-MCNC: 209 MG/DL
PH URINE: 6
PLATELET # BLD AUTO: 301 K/UL
POTASSIUM SERPL-SCNC: 3.7 MMOL/L
PROT SERPL-MCNC: 7.1 G/DL
PROTEIN URINE: ABNORMAL
RBC # BLD: 5.71 M/UL
RBC # FLD: 16 %
SODIUM SERPL-SCNC: 140 MMOL/L
SPECIFIC GRAVITY URINE: >=1.03
T4 FREE SERPL-MCNC: 1.5 NG/DL
TRIGL SERPL-MCNC: 169 MG/DL
TSH SERPL-ACNC: 2.45 UIU/ML
UROBILINOGEN URINE: NORMAL
WBC # FLD AUTO: 4.68 K/UL

## 2022-07-06 ENCOUNTER — NON-APPOINTMENT (OUTPATIENT)
Age: 62
End: 2022-07-06

## 2022-07-06 ENCOUNTER — APPOINTMENT (OUTPATIENT)
Dept: INTERNAL MEDICINE | Facility: CLINIC | Age: 62
End: 2022-07-06

## 2022-07-06 VITALS
WEIGHT: 117 LBS | SYSTOLIC BLOOD PRESSURE: 118 MMHG | DIASTOLIC BLOOD PRESSURE: 80 MMHG | OXYGEN SATURATION: 98 % | HEART RATE: 77 BPM | HEIGHT: 61 IN | TEMPERATURE: 97.8 F | BODY MASS INDEX: 22.09 KG/M2

## 2022-07-06 PROCEDURE — 93000 ELECTROCARDIOGRAM COMPLETE: CPT | Mod: 59

## 2022-07-06 PROCEDURE — G0444 DEPRESSION SCREEN ANNUAL: CPT | Mod: 59

## 2022-07-06 PROCEDURE — 99396 PREV VISIT EST AGE 40-64: CPT | Mod: 25

## 2022-07-07 ENCOUNTER — APPOINTMENT (OUTPATIENT)
Dept: ORTHOPEDIC SURGERY | Facility: CLINIC | Age: 62
End: 2022-07-07

## 2022-07-07 VITALS
WEIGHT: 115 LBS | DIASTOLIC BLOOD PRESSURE: 84 MMHG | HEIGHT: 61 IN | BODY MASS INDEX: 21.71 KG/M2 | HEART RATE: 75 BPM | SYSTOLIC BLOOD PRESSURE: 156 MMHG

## 2022-07-07 DIAGNOSIS — M41.9 SCOLIOSIS, UNSPECIFIED: ICD-10-CM

## 2022-07-07 DIAGNOSIS — M76.31 ILIOTIBIAL BAND SYNDROME, RIGHT LEG: ICD-10-CM

## 2022-07-07 LAB
APPEARANCE: CLEAR
BILIRUBIN URINE: NEGATIVE
BLOOD URINE: NEGATIVE
COLOR: NORMAL
GLUCOSE QUALITATIVE U: NEGATIVE
KETONES URINE: NEGATIVE
LEUKOCYTE ESTERASE URINE: NEGATIVE
NITRITE URINE: NEGATIVE
PH URINE: 7
PROTEIN URINE: NEGATIVE
SPECIFIC GRAVITY URINE: 1.01
UROBILINOGEN URINE: NORMAL

## 2022-07-07 PROCEDURE — 99204 OFFICE O/P NEW MOD 45 MIN: CPT

## 2022-07-07 PROCEDURE — 72100 X-RAY EXAM L-S SPINE 2/3 VWS: CPT

## 2022-07-07 PROCEDURE — 73502 X-RAY EXAM HIP UNI 2-3 VIEWS: CPT

## 2022-07-07 NOTE — HISTORY OF PRESENT ILLNESS
[id number]  [facility] 11/2/18    See information below. Message left for Pt to contact clinic regarding \"not having insulin available for 9/2/18\".  Awaiting return call.   [de-identified] : 61 y/o woman presents for annual physical exam. she feels well overall currently, no new concerns. no recent illnesses. \par she had lab testing completed prior to visit \par \par she is managing likely IT band syndrome of R side with PT currently, some gradual improvement noted \par \par chronic GERD controlled on PPI \par \par mild IFG on diet control, monitoring mild Hgba1c elevation stable \par \par HTN controlled on current rx \par \par chronic hyperlipidemia noted and again reviewed w pt, she had been resistant to starting statin \par \par last colonoscopy 2015, repeat 5 years advised \par \par mammography last completed in 5/21\par \par she had COVID vaccines x 3 doses \par

## 2022-07-07 NOTE — ASSESSMENT
[FreeTextEntry1] : discussed w pt \par \par reviewed current labs w pt \par \par discussed chronic and likely familial hyperlipidemia over years, she is now willing to consider rx options. we discussed statin therapy in detail and will initiate atorvastatin 20 mg qd , repeat labs in 3 months for monitoring \par \par reviewed current rx \par \par routine diet, exercise advised \par \par advised GYN screening, and mammography due, ordered \par \par advised repeat colonoscopy this year, she plans to make appt w Dr Witt GI \par \par she will cont PT for IT band syndrome, ortho f/u if no improvement \par \par will repeat UA today due to some proteinuria noted, likely from mild dehydration/fasting state \par \par RTO 3 months, repeat lipids on new statin, rx reviewed, call prn \par \par

## 2022-07-07 NOTE — HEALTH RISK ASSESSMENT
[No] : In the past 12 months have you used drugs other than those required for medical reasons? No [Patient reported mammogram was normal] : Patient reported mammogram was normal [None] : None [] :  [MammogramDate] : 05/21 [ColonoscopyDate] : 01/15

## 2022-07-07 NOTE — PHYSICAL EXAM
[No Acute Distress] : no acute distress [Well Nourished] : well nourished [Well Developed] : well developed [Well-Appearing] : well-appearing [Normal Voice/Communication] : normal voice/communication [Normal Sclera/Conjunctiva] : normal sclera/conjunctiva [PERRL] : pupils equal round and reactive to light [Normal Outer Ear/Nose] : the outer ears and nose were normal in appearance [Normal Oropharynx] : the oropharynx was normal [Normal TMs] : both tympanic membranes were normal [No JVD] : no jugular venous distention [No Lymphadenopathy] : no lymphadenopathy [Supple] : supple [Thyroid Normal, No Nodules] : the thyroid was normal and there were no nodules present [No Respiratory Distress] : no respiratory distress  [No Accessory Muscle Use] : no accessory muscle use [Clear to Auscultation] : lungs were clear to auscultation bilaterally [Normal Rate] : normal rate  [Regular Rhythm] : with a regular rhythm [Normal S1, S2] : normal S1 and S2 [No Murmur] : no murmur heard [No Carotid Bruits] : no carotid bruits [No Abdominal Bruit] : a ~M bruit was not heard ~T in the abdomen [No Varicosities] : no varicosities [Pedal Pulses Present] : the pedal pulses are present [No Edema] : there was no peripheral edema [No Palpable Aorta] : no palpable aorta [No Extremity Clubbing/Cyanosis] : no extremity clubbing/cyanosis [Declined Breast Exam] : declined breast exam  [Soft] : abdomen soft [Non Tender] : non-tender [Non-distended] : non-distended [No Masses] : no abdominal mass palpated [No HSM] : no HSM [Normal Bowel Sounds] : normal bowel sounds [Normal Supraclavicular Nodes] : no supraclavicular lymphadenopathy [Normal Posterior Cervical Nodes] : no posterior cervical lymphadenopathy [Normal Anterior Cervical Nodes] : no anterior cervical lymphadenopathy [No Spinal Tenderness] : no spinal tenderness [No Joint Swelling] : no joint swelling [Grossly Normal Strength/Tone] : grossly normal strength/tone [No Rash] : no rash [Coordination Grossly Intact] : coordination grossly intact [No Focal Deficits] : no focal deficits [Normal Gait] : normal gait [Speech Grossly Normal] : speech grossly normal [Normal Affect] : the affect was normal [Alert and Oriented x3] : oriented to person, place, and time [Normal Mood] : the mood was normal [Normal Insight/Judgement] : insight and judgment were intact [Memory Grossly Normal] : memory grossly normal

## 2022-07-15 ENCOUNTER — APPOINTMENT (OUTPATIENT)
Dept: ORTHOPEDIC SURGERY | Facility: CLINIC | Age: 62
End: 2022-07-15

## 2022-07-15 VITALS
SYSTOLIC BLOOD PRESSURE: 136 MMHG | WEIGHT: 116 LBS | BODY MASS INDEX: 21.9 KG/M2 | HEIGHT: 61 IN | DIASTOLIC BLOOD PRESSURE: 82 MMHG | HEART RATE: 75 BPM

## 2022-07-15 PROCEDURE — 72082 X-RAY EXAM ENTIRE SPI 2/3 VW: CPT

## 2022-07-15 PROCEDURE — 99214 OFFICE O/P EST MOD 30 MIN: CPT

## 2022-07-15 NOTE — HISTORY OF PRESENT ILLNESS
[de-identified] : 60 yo female with history of kiran tal placement for scoliosis at the age of 14 (1974) in [place]. She has had increasing low back pain with right leg radiculopathy down her buttock and lateral aspect of her leg down to her foot since January. She denies any bowel or bladder dysfunction or saddle anesthesia. She has been doing physical therapy for her back and core that was ordered by her primary care 6 weeks ago that patient has been participating in 2-3 days a week.

## 2022-07-15 NOTE — PHYSICAL EXAM
[de-identified] : Lumbar Physical Exam\par \par Gait - Normal\par \par Station - Normal\par \par Sagittal balance - Normal\par \par Compensatory mechanism? - None\par \par Heel walk - Normal\par \par Toe walk - Normal\par \par Reflexes\par Patellar - normal\par Gastroc - normal\par Clonus - No\par \par Hip Exam - Normal\par \par Straight leg raise - none\par \par Pulses - 2+ dp/pt\par \par Range of motion - normal\par \par Sensation \par Sensation intact to light touch in L1, L2, L3, L4, L5 and S1 dermatomes bilaterally\par \par Motor\par 	IP	Quad	HS	TA	Gastroc	EHL\par Right	5/5	5/5	5/5	5/5	5/5	5/5\par Left	5/5	5/5	5/5	5/5	5/5	5/5 [de-identified] : Scoliosis radiographs\par Garvey tal in place\par Left shoulder slightly higher than right\par SVA within normal limits\par Loss of lumbar lordosis consistent with Garvey tal placement

## 2022-07-15 NOTE — ASSESSMENT
[FreeTextEntry1] : I had a long discussion with the patient regards to her treatment plan diagnosis.  She does have signs and symptoms concerning for lumbar radiculopathy in the setting of previous Garvey tal placement.  I would like her to have a CT myelogram.  I do not think an MRI would be appropriate given the scatter that will likely be present with Garvey rods being placed in the past.  I would also like her to continue with physical therapy which she has already begun.  She can take Tylenol and diclofenac for pain control.  She will be going on a trip in the near future.  For this trip I will give her Medrol Dosepak that she can use as needed.  I will have her follow-up when she returns back from her trip.  All questions were answered.\par \par The patient has tried the following treatments:\par Activity modification          +\par Ice/Compression                  +\par Braces                                     -\par NSAIDS                                   +\par Physical Therapy                   +\par \par Please note the above modalities been tried for over 6+ weeks over the past 2 months

## 2022-07-19 ENCOUNTER — RESULT REVIEW (OUTPATIENT)
Age: 62
End: 2022-07-19

## 2022-07-19 ENCOUNTER — APPOINTMENT (OUTPATIENT)
Dept: MAMMOGRAPHY | Facility: IMAGING CENTER | Age: 62
End: 2022-07-19

## 2022-07-19 ENCOUNTER — OUTPATIENT (OUTPATIENT)
Dept: OUTPATIENT SERVICES | Facility: HOSPITAL | Age: 62
LOS: 1 days | End: 2022-07-19
Payer: COMMERCIAL

## 2022-07-19 ENCOUNTER — NON-APPOINTMENT (OUTPATIENT)
Age: 62
End: 2022-07-19

## 2022-07-19 DIAGNOSIS — Z12.39 ENCOUNTER FOR OTHER SCREENING FOR MALIGNANT NEOPLASM OF BREAST: ICD-10-CM

## 2022-07-19 DIAGNOSIS — Z00.8 ENCOUNTER FOR OTHER GENERAL EXAMINATION: ICD-10-CM

## 2022-07-19 DIAGNOSIS — M41.9 SCOLIOSIS, UNSPECIFIED: Chronic | ICD-10-CM

## 2022-07-19 PROCEDURE — 77063 BREAST TOMOSYNTHESIS BI: CPT | Mod: 26

## 2022-07-19 PROCEDURE — 77063 BREAST TOMOSYNTHESIS BI: CPT

## 2022-07-19 PROCEDURE — 77067 SCR MAMMO BI INCL CAD: CPT | Mod: 26

## 2022-07-19 PROCEDURE — 77067 SCR MAMMO BI INCL CAD: CPT

## 2022-07-20 ENCOUNTER — FORM ENCOUNTER (OUTPATIENT)
Age: 62
End: 2022-07-20

## 2022-07-26 NOTE — ASSESSMENT
[FreeTextEntry1] : discussed w pt \par \par most likely she has developed IT band syndrome \par advised course of PT and stretching , caution w excessive activities \par will consider orthopedic eval if pain persists or no improvement \par \par RTO few weeks for routine physical or earlier prn if any new concerns

## 2022-07-26 NOTE — PHYSICAL EXAM
[No Acute Distress] : no acute distress [Well-Appearing] : well-appearing [Normal Voice/Communication] : normal voice/communication [No Edema] : there was no peripheral edema [No Joint Swelling] : no joint swelling [Speech Grossly Normal] : speech grossly normal [Alert and Oriented x3] : oriented to person, place, and time [Normal Mood] : the mood was normal [de-identified] : CRAIG neg

## 2022-07-26 NOTE — REVIEW OF SYSTEMS
[Muscle Pain] : muscle pain [Negative] : Neurological [Joint Pain] : no joint pain [Joint Stiffness] : no joint stiffness [Joint Swelling] : no joint swelling

## 2022-07-26 NOTE — HISTORY OF PRESENT ILLNESS
[de-identified] : presents for eval of recurrent R lateral hip and outer thigh pain. no lower leg weakness or numbness. no specific fall or injury. no swelling of R LE. \par

## 2022-08-01 ENCOUNTER — APPOINTMENT (OUTPATIENT)
Dept: INTERNAL MEDICINE | Facility: CLINIC | Age: 62
End: 2022-08-01

## 2022-08-01 VITALS
DIASTOLIC BLOOD PRESSURE: 80 MMHG | TEMPERATURE: 98.2 F | WEIGHT: 116 LBS | HEART RATE: 75 BPM | BODY MASS INDEX: 21.9 KG/M2 | HEIGHT: 61 IN | OXYGEN SATURATION: 98 % | SYSTOLIC BLOOD PRESSURE: 138 MMHG

## 2022-08-01 DIAGNOSIS — K57.32 DIVERTICULITIS OF LARGE INTESTINE W/OUT PERFORATION OR ABSCESS W/OUT BLEEDING: ICD-10-CM

## 2022-08-01 PROCEDURE — 99213 OFFICE O/P EST LOW 20 MIN: CPT | Mod: 25

## 2022-08-02 NOTE — ASSESSMENT
[FreeTextEntry1] : discussed w pt \par reviewed vitals \par hx of past diverticulitis noted . she recalls she had servings of nuts couple of days prior. \par pain improving, 3/10 currently. \par most likely mild diverticulitis. \par advised increase oral fluids, simple foods, avoid constipation. \par will prescribe ciprofloxacin x 7 days for use in case of worsening pain - she will be traveling next week. she is aware to seek urgent care if any worsening symptoms, start antibiotics. \par f/u in 2-3 weeks after return from trip to [place], call earlier prn

## 2022-08-02 NOTE — PHYSICAL EXAM
[No Acute Distress] : no acute distress [Well-Appearing] : well-appearing [Normal Voice/Communication] : normal voice/communication [Soft] : abdomen soft [Non-distended] : non-distended [Normal Bowel Sounds] : normal bowel sounds [No CVA Tenderness] : no CVA  tenderness [Speech Grossly Normal] : speech grossly normal [Normal Affect] : the affect was normal [Alert and Oriented x3] : oriented to person, place, and time [Normal Mood] : the mood was normal [Normal Insight/Judgement] : insight and judgment were intact [de-identified] : mild tenderness to deep palpation L lower abd region, no guarding or rebound

## 2022-08-02 NOTE — HISTORY OF PRESENT ILLNESS
[FreeTextEntry8] : presents for eval of moderate L lower abdominal discomfort for past 3 days. started 6/10 level pain, has gradually improved to 3/10 currently. seems to be intermittent, no radiation, L lower abdomen, not specifically in LLQ. no vomiting, nausea, diarrhea, melena or hematochezia,. no urinary symptoms, afebrile. \par hx of pat diverticulitis episode, > 5 yrs ago. feels somewhat similar.

## 2022-08-02 NOTE — REVIEW OF SYSTEMS
[Negative] : Heme/Lymph [Fever] : no fever [Chills] : no chills [Fatigue] : no fatigue [Nausea] : no nausea [Diarrhea] : diarrhea [Vomiting] : no vomiting [Melena] : no melena [Dysuria] : no dysuria [Hematuria] : no hematuria

## 2022-08-29 ENCOUNTER — RX RENEWAL (OUTPATIENT)
Age: 62
End: 2022-08-29

## 2022-09-21 ENCOUNTER — APPOINTMENT (OUTPATIENT)
Dept: INTERNAL MEDICINE | Facility: CLINIC | Age: 62
End: 2022-09-21

## 2022-09-21 PROCEDURE — 36415 COLL VENOUS BLD VENIPUNCTURE: CPT

## 2022-09-28 ENCOUNTER — APPOINTMENT (OUTPATIENT)
Dept: INTERNAL MEDICINE | Facility: CLINIC | Age: 62
End: 2022-09-28

## 2022-09-28 VITALS
HEIGHT: 61 IN | OXYGEN SATURATION: 98 % | TEMPERATURE: 94.1 F | HEART RATE: 73 BPM | WEIGHT: 117 LBS | DIASTOLIC BLOOD PRESSURE: 80 MMHG | SYSTOLIC BLOOD PRESSURE: 120 MMHG | BODY MASS INDEX: 22.09 KG/M2

## 2022-09-28 DIAGNOSIS — Z23 ENCOUNTER FOR IMMUNIZATION: ICD-10-CM

## 2022-09-28 LAB
ALBUMIN SERPL ELPH-MCNC: 4.7 G/DL
ALP BLD-CCNC: 69 U/L
ALT SERPL-CCNC: 23 U/L
ANION GAP SERPL CALC-SCNC: 15 MMOL/L
AST SERPL-CCNC: 20 U/L
BASOPHILS # BLD AUTO: 0.02 K/UL
BASOPHILS NFR BLD AUTO: 0.4 %
BILIRUB SERPL-MCNC: 0.4 MG/DL
BUN SERPL-MCNC: 11 MG/DL
CALCIUM SERPL-MCNC: 10 MG/DL
CHLORIDE SERPL-SCNC: 101 MMOL/L
CHOLEST SERPL-MCNC: 189 MG/DL
CO2 SERPL-SCNC: 24 MMOL/L
CREAT SERPL-MCNC: 0.6 MG/DL
EGFR: 102 ML/MIN/1.73M2
EOSINOPHIL # BLD AUTO: 0.19 K/UL
EOSINOPHIL NFR BLD AUTO: 3.8 %
ESTIMATED AVERAGE GLUCOSE: 128 MG/DL
GLUCOSE SERPL-MCNC: 100 MG/DL
HBA1C MFR BLD HPLC: 6.1 %
HCT VFR BLD CALC: 42.1 %
HDLC SERPL-MCNC: 55 MG/DL
HGB BLD-MCNC: 11.8 G/DL
IMM GRANULOCYTES NFR BLD AUTO: 0.4 %
LDLC SERPL CALC-MCNC: 84 MG/DL
LYMPHOCYTES # BLD AUTO: 2.19 K/UL
LYMPHOCYTES NFR BLD AUTO: 43.9 %
MAN DIFF?: NORMAL
MCHC RBC-ENTMCNC: 21.2 PG
MCHC RBC-ENTMCNC: 28 GM/DL
MCV RBC AUTO: 75.6 FL
MONOCYTES # BLD AUTO: 0.33 K/UL
MONOCYTES NFR BLD AUTO: 6.6 %
NEUTROPHILS # BLD AUTO: 2.24 K/UL
NEUTROPHILS NFR BLD AUTO: 44.9 %
NONHDLC SERPL-MCNC: 134 MG/DL
PLATELET # BLD AUTO: 340 K/UL
POTASSIUM SERPL-SCNC: 3.8 MMOL/L
PROT SERPL-MCNC: 7.2 G/DL
RBC # BLD: 5.57 M/UL
RBC # FLD: 17.8 %
SODIUM SERPL-SCNC: 141 MMOL/L
TRIGL SERPL-MCNC: 249 MG/DL
WBC # FLD AUTO: 4.99 K/UL

## 2022-09-28 PROCEDURE — 90686 IIV4 VACC NO PRSV 0.5 ML IM: CPT

## 2022-09-28 PROCEDURE — 99214 OFFICE O/P EST MOD 30 MIN: CPT | Mod: 25

## 2022-09-28 PROCEDURE — G0008: CPT

## 2022-09-28 RX ORDER — METHYLPREDNISOLONE 4 MG/1
4 TABLET ORAL
Qty: 1 | Refills: 0 | Status: DISCONTINUED | COMMUNITY
Start: 2022-07-15 | End: 2022-09-28

## 2022-09-28 RX ORDER — DICLOFENAC SODIUM 50 MG/1
50 TABLET, DELAYED RELEASE ORAL
Qty: 28 | Refills: 0 | Status: DISCONTINUED | COMMUNITY
Start: 2022-07-15 | End: 2022-09-28

## 2022-09-28 RX ORDER — CIPROFLOXACIN HYDROCHLORIDE 500 MG/1
500 TABLET, FILM COATED ORAL TWICE DAILY
Qty: 14 | Refills: 0 | Status: DISCONTINUED | COMMUNITY
Start: 2022-08-01 | End: 2022-09-28

## 2022-09-28 NOTE — PHYSICAL EXAM
[No Acute Distress] : no acute distress [Well-Appearing] : well-appearing [Normal Voice/Communication] : normal voice/communication [Normal] : no respiratory distress, lungs were clear to auscultation bilaterally and no accessory muscle use [No Edema] : there was no peripheral edema [Normal Gait] : normal gait [Speech Grossly Normal] : speech grossly normal [Alert and Oriented x3] : oriented to person, place, and time PTH elevated 186. Corrected Ca at goal 8.4 - 9.5. PTH at goal <600 [Normal Mood] : the mood was normal [Normal Insight/Judgement] : insight and judgment were intact

## 2022-09-28 NOTE — HISTORY OF PRESENT ILLNESS
[de-identified] : presents for f/u visit for review of recent repeat labs, monitoring hyperlipidemia, now on atorvastatin and tolerating well. \par \par lipids significantly improved, LDL < 100. though trigs have increased. she has improved her diet as well. \par IFG unchanged Hgba1c 6% range stable \par HTN controlled on rx \par \par she would like to discuss influenza vaccine today \par \par episode of likely mild diverticulitis from 1-2 months ago resolved without any medication intervention - she adjusted her diet and pain resolved. she is aware to reevaluate if she has any recurrence

## 2022-09-28 NOTE — ASSESSMENT
[FreeTextEntry1] : discussed w pt\par \par reviewed labs w pt in detail \par \par cont current rx\par \par cont to reduce carb intake, rice especially, advised on risks of developing DM , monitor Hgba1c \par \par diverticulitis resolved , advised to evaluate further with surgeon/GI if she has recurrent episodes \par \par influenza vaccine discussed and administered today\par \par RTO 3 months for f/u, monitor Hgba1c , lipids , call earlier prn \par

## 2022-09-30 ENCOUNTER — RX RENEWAL (OUTPATIENT)
Age: 62
End: 2022-09-30

## 2022-11-11 ENCOUNTER — APPOINTMENT (OUTPATIENT)
Dept: ORTHOPEDIC SURGERY | Facility: CLINIC | Age: 62
End: 2022-11-11

## 2022-11-14 ENCOUNTER — APPOINTMENT (OUTPATIENT)
Dept: ORTHOPEDIC SURGERY | Facility: CLINIC | Age: 62
End: 2022-11-14

## 2022-11-30 ENCOUNTER — APPOINTMENT (OUTPATIENT)
Dept: ORTHOPEDIC SURGERY | Facility: CLINIC | Age: 62
End: 2022-11-30

## 2022-11-30 VITALS
SYSTOLIC BLOOD PRESSURE: 161 MMHG | DIASTOLIC BLOOD PRESSURE: 81 MMHG | BODY MASS INDEX: 22.09 KG/M2 | HEIGHT: 61 IN | WEIGHT: 117 LBS

## 2022-11-30 PROCEDURE — 99214 OFFICE O/P EST MOD 30 MIN: CPT

## 2022-11-30 NOTE — PHYSICAL EXAM
[de-identified] : Lumbar Physical Exam\par \par Gait - Normal\par \par Station - Normal\par \par Sagittal balance - Normal\par \par Compensatory mechanism? - None\par \par Heel walk - Normal\par \par Toe walk - Normal\par \par Reflexes\par Patellar - normal\par Gastroc - normal\par Clonus - No\par \par Hip Exam - Normal\par \par Straight leg raise - none\par \par Pulses - 2+ dp/pt\par \par Range of motion - normal\par \par Sensation \par Sensation intact to light touch in L1, L2, L3, L4, L5 and S1 dermatomes bilaterally\par \par Motor\par 	IP	Quad	HS	TA	Gastroc	EHL\par Right	5/5	5/5	5/5	5/5	5/5	5/5\par Left	5/5	5/5	5/5	5/5	5/5	5/5 [de-identified] : Scoliosis radiographs\par Garvey tal in place\par Left shoulder slightly higher than right\par SVA within normal limits\par Loss of lumbar lordosis consistent with Garvey tal placement

## 2022-11-30 NOTE — HISTORY OF PRESENT ILLNESS
[de-identified] : This is a 61-year-old female here today for evaluation of her back and lower extremity pain.  She has a previous history of Garvey tal placement while in she was younger.  She is dealing with some fairly significant back and right lower extremity pain.  The pain does go over her right lateral thigh into her lower right lateral calf.  She denies any bowel bladder issues.  She denies any saddle anesthesia.  At times the symptoms can be disabling.  She feels it does worsen when she walks or does any prolonged amount of activities.

## 2022-11-30 NOTE — HISTORY OF PRESENT ILLNESS
[de-identified] : This is a 61-year-old female here today for evaluation of her back and lower extremity pain.  She has a previous history of Garvey tal placement while in she was younger.  She is dealing with some fairly significant back and right lower extremity pain.  The pain does go over her right lateral thigh into her lower right lateral calf.  She denies any bowel bladder issues.  She denies any saddle anesthesia.  At times the symptoms can be disabling.  She feels it does worsen when she walks or does any prolonged amount of activities.

## 2022-11-30 NOTE — PHYSICAL EXAM
[de-identified] : Lumbar Physical Exam\par \par Gait - Normal\par \par Station - Normal\par \par Sagittal balance - Normal\par \par Compensatory mechanism? - None\par \par Heel walk - Normal\par \par Toe walk - Normal\par \par Reflexes\par Patellar - normal\par Gastroc - normal\par Clonus - No\par \par Hip Exam - Normal\par \par Straight leg raise - none\par \par Pulses - 2+ dp/pt\par \par Range of motion - normal\par \par Sensation \par Sensation intact to light touch in L1, L2, L3, L4, L5 and S1 dermatomes bilaterally\par \par Motor\par 	IP	Quad	HS	TA	Gastroc	EHL\par Right	5/5	5/5	5/5	5/5	5/5	5/5\par Left	5/5	5/5	5/5	5/5	5/5	5/5 [de-identified] : Scoliosis radiographs\par Garvey tal in place\par Left shoulder slightly higher than right\par SVA within normal limits\par Loss of lumbar lordosis consistent with Garvey tal placement

## 2022-11-30 NOTE — ASSESSMENT
[FreeTextEntry1] : I had a long discussion with the patient in regards to her treatment plan and diagnosis.  I am still concerned that she has a area of stenosis that may be causing her right lower extremity radiculopathy.  As result I would like to proceed with a lumbar myelogram.  Hopefully we can get this set up as soon as possible.  I would also like her to take an anti-inflammatory and Tylenol, we will try diclofenac this time.  The patient was in agreement this plan.\par \par The patient has tried the following treatments:\par Activity modification          +\par Ice/Compression                  +\par Braces                                     -\par NSAIDS                                   +\par Physical Therapy                   -\par \par Please note above modalities have been tried for 6+ weeks, over the past 2M

## 2023-02-02 ENCOUNTER — APPOINTMENT (OUTPATIENT)
Dept: INTERNAL MEDICINE | Facility: CLINIC | Age: 63
End: 2023-02-02
Payer: COMMERCIAL

## 2023-02-02 ENCOUNTER — NON-APPOINTMENT (OUTPATIENT)
Age: 63
End: 2023-02-02

## 2023-02-02 VITALS
TEMPERATURE: 97.3 F | HEART RATE: 88 BPM | OXYGEN SATURATION: 97 % | DIASTOLIC BLOOD PRESSURE: 80 MMHG | HEIGHT: 61 IN | SYSTOLIC BLOOD PRESSURE: 124 MMHG

## 2023-02-02 PROCEDURE — 99213 OFFICE O/P EST LOW 20 MIN: CPT | Mod: 25

## 2023-02-02 PROCEDURE — 93000 ELECTROCARDIOGRAM COMPLETE: CPT

## 2023-02-02 RX ORDER — TIZANIDINE 2 MG/1
2 TABLET ORAL EVERY 6 HOURS
Qty: 56 | Refills: 0 | Status: DISCONTINUED | COMMUNITY
Start: 2022-11-30 | End: 2023-02-02

## 2023-02-02 RX ORDER — DICLOFENAC SODIUM 50 MG/1
50 TABLET, DELAYED RELEASE ORAL
Qty: 28 | Refills: 0 | Status: DISCONTINUED | COMMUNITY
Start: 2022-11-30 | End: 2023-02-02

## 2023-02-02 NOTE — ASSESSMENT
[FreeTextEntry1] : discussed w pt \par reviewed symptoms. current EKG unchanged. her symptoms are suggestive of recurrence of GERD symptoms after stopping PPI. suggested restarting rx. also may be some component of post-COVID. also noted her mother is in hospice currently, some stressors are significant. \par monitor and reevaluate if any worsening symptoms or chest pain occurs. call prn

## 2023-02-02 NOTE — REVIEW OF SYSTEMS
[Negative] : Heme/Lymph [Fever] : no fever [Chills] : no chills [Fatigue] : no fatigue [Night Sweats] : no night sweats [Recent Change In Weight] : ~T no recent weight change [Abdominal Pain] : no abdominal pain [Vomiting] : no vomiting

## 2023-02-02 NOTE — HISTORY OF PRESENT ILLNESS
[de-identified] : presents for eval of sensation of mild 'palpitations' or upper chest sensation for past 2-3 weeks. occurs mostly in am when first arising. no chest pain. mild sensation of burning as well. \par she had COVID illness 1 month ago, fairly mild symptoms. no residual cough or dyspnea. \par \par noted she has hx of definitive GERD but she stopped omeprazole few months ago as it improved \par \par feels well currently. EKG currently NSR no changes compared to prior, HR 70s \par \par HTN controlled on current rx

## 2023-02-27 ENCOUNTER — RX RENEWAL (OUTPATIENT)
Age: 63
End: 2023-02-27

## 2023-04-25 ENCOUNTER — APPOINTMENT (OUTPATIENT)
Dept: INTERNAL MEDICINE | Facility: CLINIC | Age: 63
End: 2023-04-25
Payer: COMMERCIAL

## 2023-04-25 VITALS
OXYGEN SATURATION: 98 % | TEMPERATURE: 97.4 F | WEIGHT: 117 LBS | DIASTOLIC BLOOD PRESSURE: 80 MMHG | SYSTOLIC BLOOD PRESSURE: 130 MMHG | HEIGHT: 61 IN | BODY MASS INDEX: 22.09 KG/M2 | HEART RATE: 74 BPM

## 2023-04-25 PROCEDURE — 99213 OFFICE O/P EST LOW 20 MIN: CPT

## 2023-04-27 ENCOUNTER — RX RENEWAL (OUTPATIENT)
Age: 63
End: 2023-04-27

## 2023-05-01 ENCOUNTER — RX RENEWAL (OUTPATIENT)
Age: 63
End: 2023-05-01

## 2023-06-23 ENCOUNTER — RX RENEWAL (OUTPATIENT)
Age: 63
End: 2023-06-23

## 2023-10-02 ENCOUNTER — RX RENEWAL (OUTPATIENT)
Age: 63
End: 2023-10-02

## 2023-11-14 ENCOUNTER — APPOINTMENT (OUTPATIENT)
Dept: INTERNAL MEDICINE | Facility: CLINIC | Age: 63
End: 2023-11-14
Payer: COMMERCIAL

## 2023-11-14 ENCOUNTER — NON-APPOINTMENT (OUTPATIENT)
Age: 63
End: 2023-11-14

## 2023-11-14 VITALS
DIASTOLIC BLOOD PRESSURE: 81 MMHG | WEIGHT: 117 LBS | OXYGEN SATURATION: 97 % | HEIGHT: 61 IN | TEMPERATURE: 98.2 F | HEART RATE: 79 BPM | BODY MASS INDEX: 22.09 KG/M2 | SYSTOLIC BLOOD PRESSURE: 138 MMHG

## 2023-11-14 DIAGNOSIS — R73.01 IMPAIRED FASTING GLUCOSE: ICD-10-CM

## 2023-11-14 DIAGNOSIS — Z00.00 ENCOUNTER FOR GENERAL ADULT MEDICAL EXAMINATION W/OUT ABNORMAL FINDINGS: ICD-10-CM

## 2023-11-14 DIAGNOSIS — Z12.12 ENCOUNTER FOR SCREENING FOR MALIGNANT NEOPLASM OF COLON: ICD-10-CM

## 2023-11-14 DIAGNOSIS — Z12.11 ENCOUNTER FOR SCREENING FOR MALIGNANT NEOPLASM OF COLON: ICD-10-CM

## 2023-11-14 PROCEDURE — 93000 ELECTROCARDIOGRAM COMPLETE: CPT

## 2023-11-14 PROCEDURE — 99396 PREV VISIT EST AGE 40-64: CPT | Mod: 25

## 2023-11-16 ENCOUNTER — APPOINTMENT (OUTPATIENT)
Dept: MAMMOGRAPHY | Facility: IMAGING CENTER | Age: 63
End: 2023-11-16
Payer: COMMERCIAL

## 2023-11-16 ENCOUNTER — RESULT REVIEW (OUTPATIENT)
Age: 63
End: 2023-11-16

## 2023-11-16 ENCOUNTER — OUTPATIENT (OUTPATIENT)
Dept: OUTPATIENT SERVICES | Facility: HOSPITAL | Age: 63
LOS: 1 days | End: 2023-11-16
Payer: COMMERCIAL

## 2023-11-16 DIAGNOSIS — M41.9 SCOLIOSIS, UNSPECIFIED: Chronic | ICD-10-CM

## 2023-11-16 DIAGNOSIS — Z00.8 ENCOUNTER FOR OTHER GENERAL EXAMINATION: ICD-10-CM

## 2023-11-16 DIAGNOSIS — Z12.39 ENCOUNTER FOR OTHER SCREENING FOR MALIGNANT NEOPLASM OF BREAST: ICD-10-CM

## 2023-11-16 PROCEDURE — 77063 BREAST TOMOSYNTHESIS BI: CPT | Mod: 26

## 2023-11-16 PROCEDURE — 77063 BREAST TOMOSYNTHESIS BI: CPT

## 2023-11-16 PROCEDURE — 77067 SCR MAMMO BI INCL CAD: CPT | Mod: 26

## 2023-11-16 PROCEDURE — 77067 SCR MAMMO BI INCL CAD: CPT

## 2023-12-07 LAB
25(OH)D3 SERPL-MCNC: 30.9 NG/ML
ALBUMIN SERPL ELPH-MCNC: 4.6 G/DL
ALP BLD-CCNC: 81 U/L
ALT SERPL-CCNC: 36 U/L
ANION GAP SERPL CALC-SCNC: 12 MMOL/L
APPEARANCE: CLEAR
AST SERPL-CCNC: 26 U/L
BACTERIA: NEGATIVE /HPF
BASOPHILS # BLD AUTO: 0.02 K/UL
BASOPHILS NFR BLD AUTO: 0.5 %
BILIRUB SERPL-MCNC: 0.5 MG/DL
BILIRUBIN URINE: NEGATIVE
BLOOD URINE: NEGATIVE
BUN SERPL-MCNC: 10 MG/DL
CALCIUM SERPL-MCNC: 9.6 MG/DL
CAST: 0 /LPF
CHLORIDE SERPL-SCNC: 104 MMOL/L
CHOLEST SERPL-MCNC: 169 MG/DL
CO2 SERPL-SCNC: 26 MMOL/L
COLOR: YELLOW
CREAT SERPL-MCNC: 0.55 MG/DL
EGFR: 104 ML/MIN/1.73M2
EOSINOPHIL # BLD AUTO: 0.09 K/UL
EOSINOPHIL NFR BLD AUTO: 2.3 %
EPITHELIAL CELLS: 0 /HPF
ESTIMATED AVERAGE GLUCOSE: 134 MG/DL
GLUCOSE QUALITATIVE U: NEGATIVE MG/DL
GLUCOSE SERPL-MCNC: 111 MG/DL
HBA1C MFR BLD HPLC: 6.3 %
HCT VFR BLD CALC: 40.2 %
HDLC SERPL-MCNC: 65 MG/DL
HGB BLD-MCNC: 11.9 G/DL
IMM GRANULOCYTES NFR BLD AUTO: 0 %
KETONES URINE: NEGATIVE MG/DL
LDLC SERPL CALC-MCNC: 87 MG/DL
LEUKOCYTE ESTERASE URINE: NEGATIVE
LYMPHOCYTES # BLD AUTO: 1.72 K/UL
LYMPHOCYTES NFR BLD AUTO: 44.6 %
MAN DIFF?: NORMAL
MCHC RBC-ENTMCNC: 21.6 PG
MCHC RBC-ENTMCNC: 29.6 GM/DL
MCV RBC AUTO: 73 FL
MICROSCOPIC-UA: NORMAL
MONOCYTES # BLD AUTO: 0.25 K/UL
MONOCYTES NFR BLD AUTO: 6.5 %
NEUTROPHILS # BLD AUTO: 1.78 K/UL
NEUTROPHILS NFR BLD AUTO: 46.1 %
NITRITE URINE: NEGATIVE
NONHDLC SERPL-MCNC: 103 MG/DL
PH URINE: 6.5
PLATELET # BLD AUTO: 307 K/UL
POTASSIUM SERPL-SCNC: 3.7 MMOL/L
PROT SERPL-MCNC: 7.3 G/DL
PROTEIN URINE: NEGATIVE MG/DL
RBC # BLD: 5.51 M/UL
RBC # FLD: 17.5 %
RED BLOOD CELLS URINE: 0 /HPF
SODIUM SERPL-SCNC: 142 MMOL/L
SPECIFIC GRAVITY URINE: 1.01
T4 FREE SERPL-MCNC: 1.4 NG/DL
TRIGL SERPL-MCNC: 91 MG/DL
TSH SERPL-ACNC: 1.78 UIU/ML
UROBILINOGEN URINE: 0.2 MG/DL
WBC # FLD AUTO: 3.86 K/UL
WHITE BLOOD CELLS URINE: 0 /HPF

## 2024-02-26 ENCOUNTER — RX RENEWAL (OUTPATIENT)
Age: 64
End: 2024-02-26

## 2024-03-29 ENCOUNTER — RX RENEWAL (OUTPATIENT)
Age: 64
End: 2024-03-29

## 2024-04-28 ENCOUNTER — RX RENEWAL (OUTPATIENT)
Age: 64
End: 2024-04-28

## 2024-04-28 RX ORDER — HYDROCHLOROTHIAZIDE 12.5 MG/1
12.5 TABLET ORAL
Qty: 90 | Refills: 2 | Status: ACTIVE | COMMUNITY
Start: 2021-07-21 | End: 1900-01-01

## 2024-05-20 RX ORDER — LOSARTAN POTASSIUM 50 MG/1
50 TABLET, FILM COATED ORAL
Qty: 90 | Refills: 1 | Status: ACTIVE | COMMUNITY
Start: 2019-11-25 | End: 1900-01-01

## 2024-06-17 ENCOUNTER — APPOINTMENT (OUTPATIENT)
Dept: INTERNAL MEDICINE | Facility: CLINIC | Age: 64
End: 2024-06-17
Payer: MEDICAID

## 2024-06-17 VITALS
WEIGHT: 118 LBS | OXYGEN SATURATION: 98 % | HEART RATE: 76 BPM | SYSTOLIC BLOOD PRESSURE: 126 MMHG | DIASTOLIC BLOOD PRESSURE: 84 MMHG | BODY MASS INDEX: 22.28 KG/M2 | HEIGHT: 61 IN | TEMPERATURE: 98.7 F

## 2024-06-17 DIAGNOSIS — K21.9 GASTRO-ESOPHAGEAL REFLUX DISEASE W/OUT ESOPHAGITIS: ICD-10-CM

## 2024-06-17 DIAGNOSIS — R73.03 PREDIABETES.: ICD-10-CM

## 2024-06-17 DIAGNOSIS — I10 ESSENTIAL (PRIMARY) HYPERTENSION: ICD-10-CM

## 2024-06-17 DIAGNOSIS — E78.5 HYPERLIPIDEMIA, UNSPECIFIED: ICD-10-CM

## 2024-06-17 DIAGNOSIS — R73.09 OTHER ABNORMAL GLUCOSE: ICD-10-CM

## 2024-06-17 PROCEDURE — 99215 OFFICE O/P EST HI 40 MIN: CPT | Mod: 25

## 2024-06-17 RX ORDER — OMEPRAZOLE 20 MG/1
20 CAPSULE, DELAYED RELEASE ORAL
Qty: 90 | Refills: 1 | Status: ACTIVE | COMMUNITY
Start: 2021-05-03 | End: 1900-01-01

## 2024-06-17 NOTE — PHYSICAL EXAM
[No Acute Distress] : no acute distress [Well Nourished] : well nourished [Well Developed] : well developed [Well-Appearing] : well-appearing [Normal Sclera/Conjunctiva] : normal sclera/conjunctiva [PERRL] : pupils equal round and reactive to light [EOMI] : extraocular movements intact [Normal Outer Ear/Nose] : the outer ears and nose were normal in appearance [Normal Oropharynx] : the oropharynx was normal [No JVD] : no jugular venous distention [No Lymphadenopathy] : no lymphadenopathy [Supple] : supple [Thyroid Normal, No Nodules] : the thyroid was normal and there were no nodules present [No Respiratory Distress] : no respiratory distress  [No Accessory Muscle Use] : no accessory muscle use [Clear to Auscultation] : lungs were clear to auscultation bilaterally [Normal Rate] : normal rate  [Regular Rhythm] : with a regular rhythm [Normal S1, S2] : normal S1 and S2 [No Carotid Bruits] : no carotid bruits [No Abdominal Bruit] : a ~M bruit was not heard ~T in the abdomen [Pedal Pulses Present] : the pedal pulses are present [No Edema] : there was no peripheral edema [No Palpable Aorta] : no palpable aorta [No Extremity Clubbing/Cyanosis] : no extremity clubbing/cyanosis [Soft] : abdomen soft [Non Tender] : non-tender [Non-distended] : non-distended [No Masses] : no abdominal mass palpated [No HSM] : no HSM [Normal Bowel Sounds] : normal bowel sounds [Normal Posterior Cervical Nodes] : no posterior cervical lymphadenopathy [Normal Anterior Cervical Nodes] : no anterior cervical lymphadenopathy [No CVA Tenderness] : no CVA  tenderness [No Spinal Tenderness] : no spinal tenderness [No Joint Swelling] : no joint swelling [Grossly Normal Strength/Tone] : grossly normal strength/tone [No Rash] : no rash [Coordination Grossly Intact] : coordination grossly intact [No Focal Deficits] : no focal deficits [Normal Gait] : normal gait [Normal Affect] : the affect was normal [Normal Insight/Judgement] : insight and judgment were intact

## 2024-06-17 NOTE — HISTORY OF PRESENT ILLNESS
[FreeTextEntry1] : Follow up [de-identified] : Ms. TEJEDA is a 63 year old female who presents to the office for follow up: Pt feeling well, no complaints. PMHx: chronic GERD controlled on PPI mild IFG on diet control, monitoring mild Hgba1c elevation stable HTN controlled on current rx- losartan, HCTZ chronic hyperlipidemia - on atorvastatin 20mg  Pt reports some intermittent chest pressure at rest -similar to prior episodes - no mason chest pain However this resolves w/ use of PPI - advised restarting medication Has done angiogram around 2012 - WNL Had stress test - around 2000 Will refer to cardio  Pt mostly fasting - will repeat labs today  Last labs 12/2023: A1C 6.3%, prior of 6.1 CBC - hgb 11.9, mcv low 73 Lipid profile WNL CMP WNL T4 WNL, TSH 1.78, WNL  Low MCV: Hb electrophoresis normal Has ?thalassemia trait  Due for CPE in November Pt will make f/up w/ ortho 126/84 118lb

## 2024-06-18 LAB
25(OH)D3 SERPL-MCNC: 37.7 NG/ML
ALBUMIN SERPL ELPH-MCNC: 4.6 G/DL
ALP BLD-CCNC: 71 U/L
ALT SERPL-CCNC: 40 U/L
ANION GAP SERPL CALC-SCNC: 14 MMOL/L
AST SERPL-CCNC: 28 U/L
BILIRUB SERPL-MCNC: 0.6 MG/DL
BUN SERPL-MCNC: 14 MG/DL
CALCIUM SERPL-MCNC: 9.4 MG/DL
CHLORIDE SERPL-SCNC: 105 MMOL/L
CHOLEST SERPL-MCNC: 164 MG/DL
CO2 SERPL-SCNC: 23 MMOL/L
CREAT SERPL-MCNC: 0.58 MG/DL
EGFR: 102 ML/MIN/1.73M2
ESTIMATED AVERAGE GLUCOSE: 114 MG/DL
FERRITIN SERPL-MCNC: 164 NG/ML
FOLATE SERPL-MCNC: >20 NG/ML
GLUCOSE SERPL-MCNC: 93 MG/DL
HBA1C MFR BLD HPLC: 5.6 %
HCT VFR BLD CALC: 40 %
HDLC SERPL-MCNC: 60 MG/DL
HGB BLD-MCNC: 11.4 G/DL
IRON SATN MFR SERPL: 35 %
IRON SERPL-MCNC: 120 UG/DL
LDLC SERPL CALC-MCNC: 83 MG/DL
MCHC RBC-ENTMCNC: 21.2 PG
MCHC RBC-ENTMCNC: 28.5 GM/DL
MCV RBC AUTO: 74.2 FL
NONHDLC SERPL-MCNC: 103 MG/DL
PLATELET # BLD AUTO: 286 K/UL
POTASSIUM SERPL-SCNC: 3.9 MMOL/L
PROT SERPL-MCNC: 7.2 G/DL
RBC # BLD: 5.39 M/UL
RBC # FLD: 17.2 %
SODIUM SERPL-SCNC: 141 MMOL/L
TIBC SERPL-MCNC: 346 UG/DL
TRIGL SERPL-MCNC: 113 MG/DL
TSH SERPL-ACNC: 1.97 UIU/ML
UIBC SERPL-MCNC: 225 UG/DL
VIT B12 SERPL-MCNC: >2000 PG/ML
WBC # FLD AUTO: 4.39 K/UL

## 2024-06-19 ENCOUNTER — APPOINTMENT (OUTPATIENT)
Dept: ORTHOPEDIC SURGERY | Facility: CLINIC | Age: 64
End: 2024-06-19
Payer: MEDICAID

## 2024-06-19 VITALS
HEIGHT: 61 IN | BODY MASS INDEX: 22.28 KG/M2 | DIASTOLIC BLOOD PRESSURE: 73 MMHG | SYSTOLIC BLOOD PRESSURE: 139 MMHG | WEIGHT: 118 LBS

## 2024-06-19 DIAGNOSIS — M54.9 DORSALGIA, UNSPECIFIED: ICD-10-CM

## 2024-06-19 PROCEDURE — 99214 OFFICE O/P EST MOD 30 MIN: CPT

## 2024-06-19 NOTE — ASSESSMENT
[FreeTextEntry1] : I had a lengthy discussion with the patient in regard to treatment plan and diagnosis. There are no red flag findings on imaging nor are there any red flag findings on clinical exams. Therefore, we will proceed with a course of conservative treatment. This would include home exercise program, Tylenol, NSAIDs as medically indicated. The patient will follow up with me in approximately 6 months. I encouraged the patient to follow-up sooner if there are any new or worsening symptoms.

## 2024-06-19 NOTE — HISTORY OF PRESENT ILLNESS
[de-identified] : Patient is a 63-year-old female who presents for f/u eval of back and LE pain. reports improvements of sxs. Details exacerbation of sxs with strenuous activity. Taking Advil 2x a week for pain relief.   11.30.22 This is a 61-year-old female here today for evaluation of her back and lower extremity pain.  She has a previous history of Garvey tal placement while in she was younger.  She is dealing with some fairly significant back and right lower extremity pain.  The pain does go over her right lateral thigh into her lower right lateral calf.  She denies any bowel bladder issues.  She denies any saddle anesthesia.  At times the symptoms can be disabling.  She feels it does worsen when she walks or does any prolonged amount of activities.

## 2024-06-19 NOTE — PHYSICAL EXAM
[de-identified] : Lumbar Physical Exam\par  \par  Gait - Normal\par  \par  Station - Normal\par  \par  Sagittal balance - Normal\par  \par  Compensatory mechanism? - None\par  \par  Heel walk - Normal\par  \par  Toe walk - Normal\par  \par  Reflexes\par  Patellar - normal\par  Gastroc - normal\par  Clonus - No\par  \par  Hip Exam - Normal\par  \par  Straight leg raise - none\par  \par  Pulses - 2+ dp/pt\par  \par  Range of motion - normal\par  \par  Sensation \par  Sensation intact to light touch in L1, L2, L3, L4, L5 and S1 dermatomes bilaterally\par  \par  Motor\par  	IP	Quad	HS	TA	Gastroc	EHL\par  Right	5/5	5/5	5/5	5/5	5/5	5/5\par  Left	5/5	5/5	5/5	5/5	5/5	5/5 [de-identified] : Scoliosis Rads  SVA wnl  Garvey tal in place possible distal junctional breakdown L4-L5  Scoliosis radiographs Garvey tal in place Left shoulder slightly higher than right SVA within normal limits Loss of lumbar lordosis consistent with Garvey tal placement

## 2024-06-19 NOTE — ADDENDUM
[FreeTextEntry1] :  I, Alem Canales, acted solely as a scribe for Dr. Agusto Pleitez MD on this date 06/19/2024   All medical record entries made by the Scribe were at my, Dr. Agusto Pleitez MD., direction and personally dictated by me on 06/19/2024. I have reviewed the chart and agree that the record accurately reflects my personal performance of the history, physical exam, assessment and plan. I have also personally directed, reviewed, and agreed with the chart.

## 2024-08-14 ENCOUNTER — APPOINTMENT (OUTPATIENT)
Dept: INTERNAL MEDICINE | Facility: CLINIC | Age: 64
End: 2024-08-14

## 2024-08-14 VITALS
DIASTOLIC BLOOD PRESSURE: 82 MMHG | OXYGEN SATURATION: 97 % | BODY MASS INDEX: 22.47 KG/M2 | HEART RATE: 81 BPM | WEIGHT: 119 LBS | SYSTOLIC BLOOD PRESSURE: 122 MMHG | HEIGHT: 61 IN | TEMPERATURE: 98 F

## 2024-08-14 DIAGNOSIS — Z02.1 ENCOUNTER FOR PRE-EMPLOYMENT EXAMINATION: ICD-10-CM

## 2024-08-14 PROCEDURE — 99215 OFFICE O/P EST HI 40 MIN: CPT | Mod: 25

## 2024-08-14 NOTE — HISTORY OF PRESENT ILLNESS
[FreeTextEntry1] : Follow up  [de-identified] : Ms. TEJEDA is a 63 year old female who presents to the office for follow up: Pt reports feeling well overall, no complaints. 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  Will need Tit[place] - MMR TB screening via [facility] - has never tested positive in the past, no hx of abnormal result chronic GERD controlled on PPI mild IFG on diet control, monitoring mild Hgba1c elevation stable HTN controlled on current rx- losartan, HCTZ chronic hyperlipidemia - on atorvastatin 20mg  Pt reports some intermittent chest pressure at rest -similar to prior episodes - no mason chest pain However this resolves w/ use of PPI - advised restarting medication Has done angiogram around 2012 - WNL Had stress test - around 2000 Will refer to cardio Pt mostly fasting - will repeat labs today  Last labs 12/2023: A1C 6.3%, prior of 6.1 CBC - hgb 11.9, mcv low 73 Lipid profile WNL CMP WNL T4 WNL, TSH 1.78, WNL  Low MCV: Hb electrophoresis normal Has ?thalassemia trait  Due for CPE in November 122/82 119lb  There are no red flag findings on imaging nor are there any red flag findings on clinical exams. Therefore, we will proceed with a course of conservative treatment. This would include home exercise program, Tylenol, NSAIDs as medically indicated. The patient will follow up with me in approximately 6 months

## 2024-08-15 LAB
MEV IGG FLD QL IA: >300 AU/ML
MEV IGG+IGM SER-IMP: POSITIVE
MUV AB SER-ACNC: POSITIVE
MUV IGG SER QL IA: >300 AU/ML
RUBV IGG FLD-ACNC: 2.18 INDEX
RUBV IGG SER-IMP: POSITIVE

## 2024-08-16 LAB
AMPHET UR-MCNC: NEGATIVE NG/ML
BARBITURATES UR-MCNC: NEGATIVE NG/ML
BENZODIAZ UR-MCNC: NEGATIVE NG/ML
COCAINE METAB.OTHER UR-MCNC: NEGATIVE NG/ML
CREATININE, URINE: 149.2 MG/DL
FENTANYL, URINE: NEGATIVE NG/ML
METHADONE SCREEN, UR: NEGATIVE NG/ML
OPIATES UR-MCNC: NEGATIVE NG/ML
OXYCODONE/OXYMORPHONE, URINE: NEGATIVE NG/ML
PCP UR-MCNC: NEGATIVE NG/ML
PH, URINE: 5.9
THC UR QL: NEGATIVE NG/ML

## 2024-08-19 LAB
M TB IFN-G BLD-IMP: NEGATIVE
QUANTIFERON TB PLUS MITOGEN MINUS NIL: >10 IU/ML
QUANTIFERON TB PLUS NIL: 0.05 IU/ML
QUANTIFERON TB PLUS TB1 MINUS NIL: 0 IU/ML
QUANTIFERON TB PLUS TB2 MINUS NIL: 0 IU/ML

## 2024-09-10 ENCOUNTER — APPOINTMENT (OUTPATIENT)
Facility: CLINIC | Age: 64
End: 2024-09-10
Payer: MEDICAID

## 2024-09-10 ENCOUNTER — LABORATORY RESULT (OUTPATIENT)
Age: 64
End: 2024-09-10

## 2024-09-10 VITALS
TEMPERATURE: 99 F | DIASTOLIC BLOOD PRESSURE: 74 MMHG | BODY MASS INDEX: 22.28 KG/M2 | SYSTOLIC BLOOD PRESSURE: 120 MMHG | OXYGEN SATURATION: 97 % | HEART RATE: 78 BPM | WEIGHT: 118 LBS | HEIGHT: 61 IN

## 2024-09-10 DIAGNOSIS — K21.9 GASTRO-ESOPHAGEAL REFLUX DISEASE W/OUT ESOPHAGITIS: ICD-10-CM

## 2024-09-10 DIAGNOSIS — Z23 ENCOUNTER FOR IMMUNIZATION: ICD-10-CM

## 2024-09-10 DIAGNOSIS — Z12.11 ENCOUNTER FOR SCREENING FOR MALIGNANT NEOPLASM OF COLON: ICD-10-CM

## 2024-09-10 DIAGNOSIS — Z12.12 ENCOUNTER FOR SCREENING FOR MALIGNANT NEOPLASM OF COLON: ICD-10-CM

## 2024-09-10 DIAGNOSIS — D50.9 IRON DEFICIENCY ANEMIA, UNSPECIFIED: ICD-10-CM

## 2024-09-10 DIAGNOSIS — R51.9 HEADACHE, UNSPECIFIED: ICD-10-CM

## 2024-09-10 PROCEDURE — 99204 OFFICE O/P NEW MOD 45 MIN: CPT | Mod: 25

## 2024-09-10 PROCEDURE — G0008: CPT

## 2024-09-10 PROCEDURE — 90686 IIV4 VACC NO PRSV 0.5 ML IM: CPT

## 2024-09-10 NOTE — HISTORY OF PRESENT ILLNESS
[Nausea] : nausea [In Morning] : in the morning [Worsening] : worsening [Vomiting] : no vomiting [Diarrhea] : no diarrhea [Constipation] : no constipation [Anorexia] : no anorexia [Sore Throat] : no sore throat [Abdominal Pain] : no abdominal pain [FreeTextEntry8] : 63 yr old F with PMH GERD with greater than 1 week of epigastric pain, reflux.  Also with general malaise.  Concerned for anemia or liver problem.  Has had GERD for > 5, has been taking daily PPI for several years, has not ever had EGD from what she can remember, has never had h pylori testing.  Now pain is more centrally, burning and associated with nausea, worst in the morning.  Took some dramamine which helped with nausea, has been taking her omeprazole 20 mg bid, concerned for taking too much.  No fever, chills, recent travel, changes to diet, changes to medication.  Paitent is s/p cholecystectomy 2018.  No ETOH, no hx of pancreatitis

## 2024-09-11 LAB
ALBUMIN SERPL ELPH-MCNC: 4.7 G/DL
ALP BLD-CCNC: 74 U/L
ALT SERPL-CCNC: 24 U/L
ANION GAP SERPL CALC-SCNC: 14 MMOL/L
AST SERPL-CCNC: 20 U/L
BASOPHILS # BLD AUTO: 0.03 K/UL
BASOPHILS NFR BLD AUTO: 0.6 %
BILIRUB SERPL-MCNC: 0.5 MG/DL
BUN SERPL-MCNC: 13 MG/DL
CALCIUM SERPL-MCNC: 9.6 MG/DL
CHLORIDE SERPL-SCNC: 99 MMOL/L
CO2 SERPL-SCNC: 27 MMOL/L
CREAT SERPL-MCNC: 0.66 MG/DL
EGFR: 99 ML/MIN/1.73M2
EOSINOPHIL # BLD AUTO: 0.08 K/UL
EOSINOPHIL NFR BLD AUTO: 1.7 %
GLUCOSE SERPL-MCNC: 123 MG/DL
HCT VFR BLD CALC: 38.2 %
HGB BLD-MCNC: 11.6 G/DL
IMM GRANULOCYTES NFR BLD AUTO: 0.4 %
LYMPHOCYTES # BLD AUTO: 2.27 K/UL
LYMPHOCYTES NFR BLD AUTO: 48.2 %
MAN DIFF?: NORMAL
MCHC RBC-ENTMCNC: 21.1 PG
MCHC RBC-ENTMCNC: 30.4 GM/DL
MCV RBC AUTO: 69.6 FL
MONOCYTES # BLD AUTO: 0.33 K/UL
MONOCYTES NFR BLD AUTO: 7 %
NEUTROPHILS # BLD AUTO: 1.98 K/UL
NEUTROPHILS NFR BLD AUTO: 42.1 %
PLATELET # BLD AUTO: 299 K/UL
POTASSIUM SERPL-SCNC: 3.5 MMOL/L
PROT SERPL-MCNC: 7.5 G/DL
RBC # BLD: 5.49 M/UL
RBC # FLD: 16.2 %
SODIUM SERPL-SCNC: 140 MMOL/L
WBC # FLD AUTO: 4.71 K/UL

## 2024-10-07 ENCOUNTER — APPOINTMENT (OUTPATIENT)
Dept: CARDIOLOGY | Facility: CLINIC | Age: 64
End: 2024-10-07
Payer: MEDICAID

## 2024-10-07 ENCOUNTER — NON-APPOINTMENT (OUTPATIENT)
Age: 64
End: 2024-10-07

## 2024-10-07 VITALS
BODY MASS INDEX: 21.73 KG/M2 | HEART RATE: 71 BPM | WEIGHT: 115 LBS | DIASTOLIC BLOOD PRESSURE: 78 MMHG | SYSTOLIC BLOOD PRESSURE: 120 MMHG | OXYGEN SATURATION: 98 %

## 2024-10-07 VITALS — DIASTOLIC BLOOD PRESSURE: 82 MMHG | SYSTOLIC BLOOD PRESSURE: 130 MMHG | RESPIRATION RATE: 15 BRPM

## 2024-10-07 DIAGNOSIS — E78.5 HYPERLIPIDEMIA, UNSPECIFIED: ICD-10-CM

## 2024-10-07 DIAGNOSIS — I10 ESSENTIAL (PRIMARY) HYPERTENSION: ICD-10-CM

## 2024-10-07 PROCEDURE — 93000 ELECTROCARDIOGRAM COMPLETE: CPT

## 2024-10-07 PROCEDURE — 99204 OFFICE O/P NEW MOD 45 MIN: CPT | Mod: 25

## 2024-10-07 RX ORDER — OMEPRAZOLE 20 MG/1
20 CAPSULE, DELAYED RELEASE ORAL
Refills: 0 | Status: ACTIVE | COMMUNITY

## 2024-10-14 ENCOUNTER — APPOINTMENT (OUTPATIENT)
Dept: RADIOLOGY | Facility: HOSPITAL | Age: 64
End: 2024-10-14
Payer: MEDICAID

## 2024-10-14 ENCOUNTER — OUTPATIENT (OUTPATIENT)
Dept: OUTPATIENT SERVICES | Facility: HOSPITAL | Age: 64
LOS: 1 days | End: 2024-10-14
Payer: MEDICAID

## 2024-10-14 DIAGNOSIS — M41.9 SCOLIOSIS, UNSPECIFIED: Chronic | ICD-10-CM

## 2024-10-14 DIAGNOSIS — R12 HEARTBURN: ICD-10-CM

## 2024-10-14 PROCEDURE — 74221 X-RAY XM ESOPHAGUS 2CNTRST: CPT

## 2024-10-14 PROCEDURE — 74221 X-RAY XM ESOPHAGUS 2CNTRST: CPT | Mod: 26

## 2024-11-05 ENCOUNTER — APPOINTMENT (OUTPATIENT)
Dept: CARDIOLOGY | Facility: CLINIC | Age: 64
End: 2024-11-05
Payer: MEDICAID

## 2024-11-05 VITALS
BODY MASS INDEX: 21.71 KG/M2 | HEIGHT: 61 IN | HEART RATE: 80 BPM | RESPIRATION RATE: 14 BRPM | WEIGHT: 115 LBS | SYSTOLIC BLOOD PRESSURE: 142 MMHG | DIASTOLIC BLOOD PRESSURE: 80 MMHG

## 2024-11-05 DIAGNOSIS — I25.10 ATHEROSCLEROTIC HEART DISEASE OF NATIVE CORONARY ARTERY W/OUT ANGINA PECTORIS: ICD-10-CM

## 2024-11-05 DIAGNOSIS — I10 ESSENTIAL (PRIMARY) HYPERTENSION: ICD-10-CM

## 2024-11-05 DIAGNOSIS — E78.5 HYPERLIPIDEMIA, UNSPECIFIED: ICD-10-CM

## 2024-11-05 DIAGNOSIS — R07.89 OTHER CHEST PAIN: ICD-10-CM

## 2024-11-05 PROCEDURE — 93306 TTE W/DOPPLER COMPLETE: CPT

## 2024-11-05 PROCEDURE — 99213 OFFICE O/P EST LOW 20 MIN: CPT | Mod: 25

## 2024-11-05 PROCEDURE — 93015 CV STRESS TEST SUPVJ I&R: CPT

## 2024-11-18 ENCOUNTER — APPOINTMENT (OUTPATIENT)
Dept: INTERNAL MEDICINE | Facility: CLINIC | Age: 64
End: 2024-11-18

## 2024-11-18 VITALS
TEMPERATURE: 97.7 F | DIASTOLIC BLOOD PRESSURE: 80 MMHG | OXYGEN SATURATION: 97 % | HEART RATE: 70 BPM | SYSTOLIC BLOOD PRESSURE: 130 MMHG | WEIGHT: 115 LBS | HEIGHT: 61.5 IN | BODY MASS INDEX: 21.43 KG/M2

## 2024-11-18 DIAGNOSIS — I10 ESSENTIAL (PRIMARY) HYPERTENSION: ICD-10-CM

## 2024-11-18 DIAGNOSIS — Z00.00 ENCOUNTER FOR GENERAL ADULT MEDICAL EXAMINATION W/OUT ABNORMAL FINDINGS: ICD-10-CM

## 2024-11-18 DIAGNOSIS — E78.5 HYPERLIPIDEMIA, UNSPECIFIED: ICD-10-CM

## 2024-11-18 PROCEDURE — 99396 PREV VISIT EST AGE 40-64: CPT | Mod: 25

## 2024-11-19 LAB
ALBUMIN SERPL ELPH-MCNC: 4.4 G/DL
ALP BLD-CCNC: 83 U/L
ALT SERPL-CCNC: 31 U/L
ANION GAP SERPL CALC-SCNC: 14 MMOL/L
AST SERPL-CCNC: 22 U/L
BILIRUB SERPL-MCNC: 0.6 MG/DL
BUN SERPL-MCNC: 12 MG/DL
CALCIUM SERPL-MCNC: 9.8 MG/DL
CHLORIDE SERPL-SCNC: 99 MMOL/L
CHOLEST SERPL-MCNC: 174 MG/DL
CO2 SERPL-SCNC: 26 MMOL/L
CREAT SERPL-MCNC: 0.57 MG/DL
EGFR: 102 ML/MIN/1.73M2
ESTIMATED AVERAGE GLUCOSE: 134 MG/DL
GLUCOSE SERPL-MCNC: 98 MG/DL
HBA1C MFR BLD HPLC: 6.3 %
HCT VFR BLD CALC: 40.5 %
HDLC SERPL-MCNC: 62 MG/DL
HGB BLD-MCNC: 11.5 G/DL
LDLC SERPL CALC-MCNC: 94 MG/DL
MCHC RBC-ENTMCNC: 20.9 PG
MCHC RBC-ENTMCNC: 28.4 G/DL
MCV RBC AUTO: 73.5 FL
NONHDLC SERPL-MCNC: 112 MG/DL
PLATELET # BLD AUTO: 305 K/UL
POTASSIUM SERPL-SCNC: 3.6 MMOL/L
PROT SERPL-MCNC: 7.3 G/DL
RBC # BLD: 5.51 M/UL
RBC # FLD: 16.9 %
SODIUM SERPL-SCNC: 139 MMOL/L
TRIGL SERPL-MCNC: 102 MG/DL
TSH SERPL-ACNC: 1.29 UIU/ML
WBC # FLD AUTO: 5.15 K/UL

## 2024-11-22 ENCOUNTER — NON-APPOINTMENT (OUTPATIENT)
Age: 64
End: 2024-11-22

## 2024-11-22 ENCOUNTER — APPOINTMENT (OUTPATIENT)
Dept: OTOLARYNGOLOGY | Facility: CLINIC | Age: 64
End: 2024-11-22
Payer: MEDICAID

## 2024-11-22 VITALS
TEMPERATURE: 98.2 F | WEIGHT: 115 LBS | SYSTOLIC BLOOD PRESSURE: 126 MMHG | HEART RATE: 70 BPM | HEIGHT: 61.5 IN | DIASTOLIC BLOOD PRESSURE: 82 MMHG | BODY MASS INDEX: 21.43 KG/M2

## 2024-11-22 DIAGNOSIS — H92.02 OTALGIA, LEFT EAR: ICD-10-CM

## 2024-11-22 DIAGNOSIS — J34.89 OTHER SPECIFIED DISORDERS OF NOSE AND NASAL SINUSES: ICD-10-CM

## 2024-11-22 PROCEDURE — 99204 OFFICE O/P NEW MOD 45 MIN: CPT | Mod: 25

## 2024-11-22 PROCEDURE — 31231 NASAL ENDOSCOPY DX: CPT

## 2024-12-02 ENCOUNTER — OUTPATIENT (OUTPATIENT)
Dept: OUTPATIENT SERVICES | Facility: HOSPITAL | Age: 64
LOS: 1 days | End: 2024-12-02

## 2024-12-02 DIAGNOSIS — Z00.8 ENCOUNTER FOR OTHER GENERAL EXAMINATION: ICD-10-CM

## 2024-12-02 DIAGNOSIS — J34.89 OTHER SPECIFIED DISORDERS OF NOSE AND NASAL SINUSES: ICD-10-CM

## 2024-12-02 DIAGNOSIS — M41.9 SCOLIOSIS, UNSPECIFIED: Chronic | ICD-10-CM

## 2024-12-04 ENCOUNTER — APPOINTMENT (OUTPATIENT)
Dept: ORTHOPEDIC SURGERY | Facility: CLINIC | Age: 64
End: 2024-12-04

## 2024-12-05 ENCOUNTER — RX RENEWAL (OUTPATIENT)
Age: 64
End: 2024-12-05

## 2024-12-19 ENCOUNTER — INPATIENT (INPATIENT)
Facility: HOSPITAL | Age: 64
LOS: 1 days | Discharge: HOME | DRG: 392 | End: 2024-12-21
Attending: STUDENT IN AN ORGANIZED HEALTH CARE EDUCATION/TRAINING PROGRAM | Admitting: STUDENT IN AN ORGANIZED HEALTH CARE EDUCATION/TRAINING PROGRAM
Payer: MEDICAID

## 2024-12-19 VITALS
HEART RATE: 94 BPM | OXYGEN SATURATION: 98 % | WEIGHT: 115.08 LBS | SYSTOLIC BLOOD PRESSURE: 147 MMHG | HEIGHT: 61 IN | DIASTOLIC BLOOD PRESSURE: 93 MMHG | TEMPERATURE: 98 F | RESPIRATION RATE: 17 BRPM

## 2024-12-19 DIAGNOSIS — M41.9 SCOLIOSIS, UNSPECIFIED: Chronic | ICD-10-CM

## 2024-12-19 LAB
ALBUMIN SERPL ELPH-MCNC: 4.8 G/DL — SIGNIFICANT CHANGE UP (ref 3.3–5)
ALP SERPL-CCNC: 81 U/L — SIGNIFICANT CHANGE UP (ref 40–120)
ALT FLD-CCNC: 44 U/L — SIGNIFICANT CHANGE UP (ref 10–45)
ANION GAP SERPL CALC-SCNC: 13 MMOL/L — SIGNIFICANT CHANGE UP (ref 5–17)
APPEARANCE UR: CLEAR — SIGNIFICANT CHANGE UP
AST SERPL-CCNC: 32 U/L — SIGNIFICANT CHANGE UP (ref 10–40)
BASOPHILS # BLD AUTO: 0 K/UL — SIGNIFICANT CHANGE UP (ref 0–0.2)
BASOPHILS NFR BLD AUTO: 0 % — SIGNIFICANT CHANGE UP (ref 0–2)
BILIRUB SERPL-MCNC: 0.8 MG/DL — SIGNIFICANT CHANGE UP (ref 0.2–1.2)
BILIRUB UR-MCNC: NEGATIVE — SIGNIFICANT CHANGE UP
BUN SERPL-MCNC: 14 MG/DL — SIGNIFICANT CHANGE UP (ref 7–23)
BURR CELLS BLD QL SMEAR: PRESENT — SIGNIFICANT CHANGE UP
CALCIUM SERPL-MCNC: 9.9 MG/DL — SIGNIFICANT CHANGE UP (ref 8.4–10.5)
CHLORIDE SERPL-SCNC: 103 MMOL/L — SIGNIFICANT CHANGE UP (ref 96–108)
CO2 SERPL-SCNC: 24 MMOL/L — SIGNIFICANT CHANGE UP (ref 22–31)
COLOR SPEC: YELLOW — SIGNIFICANT CHANGE UP
CREAT SERPL-MCNC: 0.56 MG/DL — SIGNIFICANT CHANGE UP (ref 0.5–1.3)
DIFF PNL FLD: NEGATIVE — SIGNIFICANT CHANGE UP
EGFR: 102 ML/MIN/1.73M2 — SIGNIFICANT CHANGE UP
EGFR: 102 ML/MIN/1.73M2 — SIGNIFICANT CHANGE UP
ELLIPTOCYTES BLD QL SMEAR: SLIGHT — SIGNIFICANT CHANGE UP
EOSINOPHIL # BLD AUTO: 0.13 K/UL — SIGNIFICANT CHANGE UP (ref 0–0.5)
EOSINOPHIL NFR BLD AUTO: 1.8 % — SIGNIFICANT CHANGE UP (ref 0–6)
FLUAV AG NPH QL: SIGNIFICANT CHANGE UP
FLUBV AG NPH QL: SIGNIFICANT CHANGE UP
GLUCOSE SERPL-MCNC: 111 MG/DL — HIGH (ref 70–99)
GLUCOSE UR QL: NEGATIVE MG/DL — SIGNIFICANT CHANGE UP
HCT VFR BLD CALC: 41.3 % — SIGNIFICANT CHANGE UP (ref 34.5–45)
HGB BLD-MCNC: 12.6 G/DL — SIGNIFICANT CHANGE UP (ref 11.5–15.5)
HYPOCHROMIA BLD QL: SLIGHT — SIGNIFICANT CHANGE UP
KETONES UR-MCNC: ABNORMAL MG/DL
LEUKOCYTE ESTERASE UR-ACNC: NEGATIVE — SIGNIFICANT CHANGE UP
LIDOCAIN IGE QN: 60 U/L — SIGNIFICANT CHANGE UP (ref 7–60)
LYMPHOCYTES # BLD AUTO: 0.66 K/UL — LOW (ref 1–3.3)
LYMPHOCYTES # BLD AUTO: 9 % — LOW (ref 13–44)
MANUAL SMEAR VERIFICATION: SIGNIFICANT CHANGE UP
MCHC RBC-ENTMCNC: 21.7 PG — LOW (ref 27–34)
MCHC RBC-ENTMCNC: 30.5 G/DL — LOW (ref 32–36)
MCV RBC AUTO: 71.2 FL — LOW (ref 80–100)
MONOCYTES # BLD AUTO: 0.26 K/UL — SIGNIFICANT CHANGE UP (ref 0–0.9)
MONOCYTES NFR BLD AUTO: 3.6 % — SIGNIFICANT CHANGE UP (ref 2–14)
NEUTROPHILS # BLD AUTO: 6.3 K/UL — SIGNIFICANT CHANGE UP (ref 1.8–7.4)
NEUTROPHILS NFR BLD AUTO: 85.6 % — HIGH (ref 43–77)
NITRITE UR-MCNC: NEGATIVE — SIGNIFICANT CHANGE UP
OVALOCYTES BLD QL SMEAR: SLIGHT — SIGNIFICANT CHANGE UP
PH UR: 5.5 — SIGNIFICANT CHANGE UP (ref 5–8)
PLAT MORPH BLD: NORMAL — SIGNIFICANT CHANGE UP
PLATELET # BLD AUTO: 259 K/UL — SIGNIFICANT CHANGE UP (ref 150–400)
POIKILOCYTOSIS BLD QL AUTO: SIGNIFICANT CHANGE UP
POTASSIUM SERPL-MCNC: 3.7 MMOL/L — SIGNIFICANT CHANGE UP (ref 3.5–5.3)
POTASSIUM SERPL-SCNC: 3.7 MMOL/L — SIGNIFICANT CHANGE UP (ref 3.5–5.3)
PROT SERPL-MCNC: 8 G/DL — SIGNIFICANT CHANGE UP (ref 6–8.3)
PROT UR-MCNC: SIGNIFICANT CHANGE UP MG/DL
RBC # BLD: 5.8 M/UL — HIGH (ref 3.8–5.2)
RBC # FLD: 15.9 % — HIGH (ref 10.3–14.5)
RBC BLD AUTO: ABNORMAL
RSV RNA NPH QL NAA+NON-PROBE: SIGNIFICANT CHANGE UP
SARS-COV-2 RNA SPEC QL NAA+PROBE: SIGNIFICANT CHANGE UP
SODIUM SERPL-SCNC: 140 MMOL/L — SIGNIFICANT CHANGE UP (ref 135–145)
SP GR SPEC: >1.03 — HIGH (ref 1–1.03)
TARGETS BLD QL SMEAR: SLIGHT — SIGNIFICANT CHANGE UP
TROPONIN T, HIGH SENSITIVITY RESULT: <6 NG/L — SIGNIFICANT CHANGE UP (ref 0–51)
UROBILINOGEN FLD QL: 1 MG/DL — SIGNIFICANT CHANGE UP (ref 0.2–1)
WBC # BLD: 7.36 K/UL — SIGNIFICANT CHANGE UP (ref 3.8–10.5)
WBC # FLD AUTO: 7.36 K/UL — SIGNIFICANT CHANGE UP (ref 3.8–10.5)

## 2024-12-19 RX ORDER — ACETAMINOPHEN 500 MG/5ML
1000 LIQUID (ML) ORAL ONCE
Refills: 0 | Status: COMPLETED | OUTPATIENT
Start: 2024-12-19 | End: 2024-12-19

## 2024-12-19 RX ORDER — METOCLOPRAMIDE HCL 10 MG
10 TABLET ORAL ONCE
Refills: 0 | Status: COMPLETED | OUTPATIENT
Start: 2024-12-19 | End: 2024-12-19

## 2024-12-19 RX ORDER — MAGNESIUM, ALUMINUM HYDROXIDE 200-200 MG
30 TABLET,CHEWABLE ORAL ONCE
Refills: 0 | Status: COMPLETED | OUTPATIENT
Start: 2024-12-19 | End: 2024-12-19

## 2024-12-19 RX ADMIN — Medication 10 MILLIGRAM(S): at 20:13

## 2024-12-19 RX ADMIN — Medication 400 MILLIGRAM(S): at 17:20

## 2024-12-19 RX ADMIN — Medication 30 MILLILITER(S): at 17:20

## 2024-12-19 RX ADMIN — Medication 20 MILLIGRAM(S): at 17:20

## 2024-12-20 DIAGNOSIS — K86.89 OTHER SPECIFIED DISEASES OF PANCREAS: ICD-10-CM

## 2024-12-20 DIAGNOSIS — E78.5 HYPERLIPIDEMIA, UNSPECIFIED: ICD-10-CM

## 2024-12-20 DIAGNOSIS — R11.2 NAUSEA WITH VOMITING, UNSPECIFIED: ICD-10-CM

## 2024-12-20 DIAGNOSIS — I10 ESSENTIAL (PRIMARY) HYPERTENSION: ICD-10-CM

## 2024-12-20 LAB
ALBUMIN SERPL ELPH-MCNC: 3.8 G/DL — SIGNIFICANT CHANGE UP (ref 3.3–5)
ALP SERPL-CCNC: 60 U/L — SIGNIFICANT CHANGE UP (ref 40–120)
ALT FLD-CCNC: 39 U/L — SIGNIFICANT CHANGE UP (ref 10–45)
ANION GAP SERPL CALC-SCNC: 13 MMOL/L — SIGNIFICANT CHANGE UP (ref 5–17)
AST SERPL-CCNC: 30 U/L — SIGNIFICANT CHANGE UP (ref 10–40)
BILIRUB SERPL-MCNC: 0.6 MG/DL — SIGNIFICANT CHANGE UP (ref 0.2–1.2)
BUN SERPL-MCNC: 10 MG/DL — SIGNIFICANT CHANGE UP (ref 7–23)
CALCIUM SERPL-MCNC: 8.3 MG/DL — LOW (ref 8.4–10.5)
CHLORIDE SERPL-SCNC: 107 MMOL/L — SIGNIFICANT CHANGE UP (ref 96–108)
CO2 SERPL-SCNC: 19 MMOL/L — LOW (ref 22–31)
CREAT SERPL-MCNC: 0.43 MG/DL — LOW (ref 0.5–1.3)
EGFR: 109 ML/MIN/1.73M2 — SIGNIFICANT CHANGE UP
EGFR: 109 ML/MIN/1.73M2 — SIGNIFICANT CHANGE UP
GLUCOSE SERPL-MCNC: 104 MG/DL — HIGH (ref 70–99)
HCT VFR BLD CALC: 34.2 % — LOW (ref 34.5–45)
HGB BLD-MCNC: 10.3 G/DL — LOW (ref 11.5–15.5)
MCHC RBC-ENTMCNC: 21.2 PG — LOW (ref 27–34)
MCHC RBC-ENTMCNC: 30.1 G/DL — LOW (ref 32–36)
MCV RBC AUTO: 70.5 FL — LOW (ref 80–100)
NRBC # BLD: 0 /100 WBCS — SIGNIFICANT CHANGE UP (ref 0–0)
NRBC BLD-RTO: 0 /100 WBCS — SIGNIFICANT CHANGE UP (ref 0–0)
PLATELET # BLD AUTO: 220 K/UL — SIGNIFICANT CHANGE UP (ref 150–400)
POTASSIUM SERPL-MCNC: 3.4 MMOL/L — LOW (ref 3.5–5.3)
POTASSIUM SERPL-SCNC: 3.4 MMOL/L — LOW (ref 3.5–5.3)
PROT SERPL-MCNC: 6.4 G/DL — SIGNIFICANT CHANGE UP (ref 6–8.3)
RBC # BLD: 4.85 M/UL — SIGNIFICANT CHANGE UP (ref 3.8–5.2)
RBC # FLD: 15.9 % — HIGH (ref 10.3–14.5)
SODIUM SERPL-SCNC: 139 MMOL/L — SIGNIFICANT CHANGE UP (ref 135–145)
WBC # BLD: 5.1 K/UL — SIGNIFICANT CHANGE UP (ref 3.8–10.5)
WBC # FLD AUTO: 5.1 K/UL — SIGNIFICANT CHANGE UP (ref 3.8–10.5)

## 2024-12-20 RX ORDER — SODIUM CHLORIDE 9 G/1000ML
500 INJECTION, SOLUTION INTRAVENOUS
Refills: 0 | Status: COMPLETED | OUTPATIENT
Start: 2024-12-20 | End: 2024-12-20

## 2024-12-20 RX ORDER — ONDANSETRON HCL/PF 4 MG/2 ML
4 VIAL (ML) INJECTION ONCE
Refills: 0 | Status: COMPLETED | OUTPATIENT
Start: 2024-12-20 | End: 2024-12-20

## 2024-12-20 RX ORDER — SODIUM CHLORIDE 9 G/1000ML
1000 INJECTION, SOLUTION INTRAVENOUS ONCE
Refills: 0 | Status: COMPLETED | OUTPATIENT
Start: 2024-12-20 | End: 2024-12-20

## 2024-12-20 RX ORDER — LOSARTAN POTASSIUM 100 MG/1
50 TABLET, FILM COATED ORAL DAILY
Refills: 0 | Status: DISCONTINUED | OUTPATIENT
Start: 2024-12-20 | End: 2024-12-21

## 2024-12-20 RX ORDER — ATORVASTATIN CALCIUM 80 MG/1
20 TABLET, FILM COATED ORAL AT BEDTIME
Refills: 0 | Status: DISCONTINUED | OUTPATIENT
Start: 2024-12-20 | End: 2024-12-21

## 2024-12-20 RX ADMIN — Medication 4 MILLIGRAM(S): at 21:44

## 2024-12-20 RX ADMIN — LOSARTAN POTASSIUM 50 MILLIGRAM(S): 100 TABLET, FILM COATED ORAL at 08:06

## 2024-12-20 RX ADMIN — Medication 75 MILLILITER(S): at 14:16

## 2024-12-20 RX ADMIN — Medication 4 MILLIGRAM(S): at 03:34

## 2024-12-20 RX ADMIN — Medication 125 MILLILITER(S): at 08:00

## 2024-12-20 RX ADMIN — Medication 100 MILLIEQUIVALENT(S): at 11:03

## 2024-12-20 RX ADMIN — SODIUM CHLORIDE 1000 MILLILITER(S): 9 INJECTION, SOLUTION INTRAVENOUS at 00:44

## 2024-12-20 RX ADMIN — Medication 100 MILLIEQUIVALENT(S): at 13:39

## 2024-12-20 RX ADMIN — Medication 4 MILLIGRAM(S): at 17:55

## 2024-12-20 RX ADMIN — Medication 1000 MILLILITER(S): at 03:34

## 2024-12-20 RX ADMIN — ATORVASTATIN CALCIUM 20 MILLIGRAM(S): 80 TABLET, FILM COATED ORAL at 21:04

## 2024-12-20 RX ADMIN — Medication 75 MILLILITER(S): at 21:45

## 2024-12-20 RX ADMIN — SODIUM CHLORIDE 30 MILLILITER(S): 9 INJECTION, SOLUTION INTRAVENOUS at 18:02

## 2024-12-20 RX ADMIN — Medication 1000 MILLILITER(S): at 14:17

## 2024-12-21 ENCOUNTER — TRANSCRIPTION ENCOUNTER (OUTPATIENT)
Age: 64
End: 2024-12-21

## 2024-12-21 VITALS
SYSTOLIC BLOOD PRESSURE: 143 MMHG | RESPIRATION RATE: 18 BRPM | DIASTOLIC BLOOD PRESSURE: 83 MMHG | OXYGEN SATURATION: 98 % | TEMPERATURE: 98 F | HEART RATE: 71 BPM

## 2024-12-21 LAB
ALBUMIN SERPL ELPH-MCNC: 3.4 G/DL — SIGNIFICANT CHANGE UP (ref 3.3–5)
ALP SERPL-CCNC: 52 U/L — SIGNIFICANT CHANGE UP (ref 40–120)
ALT FLD-CCNC: 38 U/L — SIGNIFICANT CHANGE UP (ref 10–45)
ANION GAP SERPL CALC-SCNC: 14 MMOL/L — SIGNIFICANT CHANGE UP (ref 5–17)
AST SERPL-CCNC: 24 U/L — SIGNIFICANT CHANGE UP (ref 10–40)
BASOPHILS # BLD AUTO: 0.01 K/UL — SIGNIFICANT CHANGE UP (ref 0–0.2)
BASOPHILS NFR BLD AUTO: 0.2 % — SIGNIFICANT CHANGE UP (ref 0–2)
BILIRUB SERPL-MCNC: 0.4 MG/DL — SIGNIFICANT CHANGE UP (ref 0.2–1.2)
BUN SERPL-MCNC: 9 MG/DL — SIGNIFICANT CHANGE UP (ref 7–23)
CALCIUM SERPL-MCNC: 7.9 MG/DL — LOW (ref 8.4–10.5)
CHLORIDE SERPL-SCNC: 105 MMOL/L — SIGNIFICANT CHANGE UP (ref 96–108)
CO2 SERPL-SCNC: 19 MMOL/L — LOW (ref 22–31)
CREAT SERPL-MCNC: 0.49 MG/DL — LOW (ref 0.5–1.3)
CULTURE RESULTS: SIGNIFICANT CHANGE UP
EGFR: 105 ML/MIN/1.73M2 — SIGNIFICANT CHANGE UP
EGFR: 105 ML/MIN/1.73M2 — SIGNIFICANT CHANGE UP
EOSINOPHIL # BLD AUTO: 0 K/UL — SIGNIFICANT CHANGE UP (ref 0–0.5)
EOSINOPHIL NFR BLD AUTO: 0 % — SIGNIFICANT CHANGE UP (ref 0–6)
GLUCOSE SERPL-MCNC: 80 MG/DL — SIGNIFICANT CHANGE UP (ref 70–99)
HCT VFR BLD CALC: 31.1 % — LOW (ref 34.5–45)
HGB BLD-MCNC: 9.6 G/DL — LOW (ref 11.5–15.5)
IMM GRANULOCYTES NFR BLD AUTO: 0.5 % — SIGNIFICANT CHANGE UP (ref 0–0.9)
LYMPHOCYTES # BLD AUTO: 0.76 K/UL — LOW (ref 1–3.3)
LYMPHOCYTES # BLD AUTO: 17.6 % — SIGNIFICANT CHANGE UP (ref 13–44)
MCHC RBC-ENTMCNC: 21.8 PG — LOW (ref 27–34)
MCHC RBC-ENTMCNC: 30.9 G/DL — LOW (ref 32–36)
MCV RBC AUTO: 70.5 FL — LOW (ref 80–100)
MONOCYTES # BLD AUTO: 0.21 K/UL — SIGNIFICANT CHANGE UP (ref 0–0.9)
MONOCYTES NFR BLD AUTO: 4.8 % — SIGNIFICANT CHANGE UP (ref 2–14)
NEUTROPHILS # BLD AUTO: 3.33 K/UL — SIGNIFICANT CHANGE UP (ref 1.8–7.4)
NEUTROPHILS NFR BLD AUTO: 76.9 % — SIGNIFICANT CHANGE UP (ref 43–77)
NRBC # BLD: 0 /100 WBCS — SIGNIFICANT CHANGE UP (ref 0–0)
NRBC BLD-RTO: 0 /100 WBCS — SIGNIFICANT CHANGE UP (ref 0–0)
PLATELET # BLD AUTO: 236 K/UL — SIGNIFICANT CHANGE UP (ref 150–400)
POTASSIUM SERPL-MCNC: 3.1 MMOL/L — LOW (ref 3.5–5.3)
POTASSIUM SERPL-SCNC: 3.1 MMOL/L — LOW (ref 3.5–5.3)
PROT SERPL-MCNC: 5.8 G/DL — LOW (ref 6–8.3)
RBC # BLD: 4.41 M/UL — SIGNIFICANT CHANGE UP (ref 3.8–5.2)
RBC # FLD: 15.9 % — HIGH (ref 10.3–14.5)
SODIUM SERPL-SCNC: 138 MMOL/L — SIGNIFICANT CHANGE UP (ref 135–145)
SPECIMEN SOURCE: SIGNIFICANT CHANGE UP
WBC # BLD: 4.33 K/UL — SIGNIFICANT CHANGE UP (ref 3.8–10.5)
WBC # FLD AUTO: 4.33 K/UL — SIGNIFICANT CHANGE UP (ref 3.8–10.5)

## 2024-12-21 RX ORDER — ATORVASTATIN CALCIUM 80 MG/1
1 TABLET, FILM COATED ORAL
Qty: 0 | Refills: 0 | DISCHARGE
Start: 2024-12-21

## 2024-12-21 RX ADMIN — Medication 40 MILLIEQUIVALENT(S): at 13:03

## 2024-12-21 RX ADMIN — LOSARTAN POTASSIUM 50 MILLIGRAM(S): 100 TABLET, FILM COATED ORAL at 05:07

## 2024-12-21 RX ADMIN — Medication 40 MILLIEQUIVALENT(S): at 10:21

## 2024-12-23 ENCOUNTER — APPOINTMENT (OUTPATIENT)
Dept: CT IMAGING | Facility: IMAGING CENTER | Age: 64
End: 2024-12-23

## 2024-12-23 ENCOUNTER — APPOINTMENT (OUTPATIENT)
Dept: MAMMOGRAPHY | Facility: IMAGING CENTER | Age: 64
End: 2024-12-23

## 2025-01-03 ENCOUNTER — APPOINTMENT (OUTPATIENT)
Dept: MAMMOGRAPHY | Facility: IMAGING CENTER | Age: 65
End: 2025-01-03
Payer: MEDICAID

## 2025-01-03 ENCOUNTER — RESULT REVIEW (OUTPATIENT)
Age: 65
End: 2025-01-03

## 2025-01-03 ENCOUNTER — OUTPATIENT (OUTPATIENT)
Dept: OUTPATIENT SERVICES | Facility: HOSPITAL | Age: 65
LOS: 1 days | End: 2025-01-03
Payer: MEDICAID

## 2025-01-03 ENCOUNTER — APPOINTMENT (OUTPATIENT)
Dept: CT IMAGING | Facility: IMAGING CENTER | Age: 65
End: 2025-01-03
Payer: MEDICAID

## 2025-01-03 DIAGNOSIS — J34.89 OTHER SPECIFIED DISORDERS OF NOSE AND NASAL SINUSES: ICD-10-CM

## 2025-01-03 DIAGNOSIS — M41.9 SCOLIOSIS, UNSPECIFIED: Chronic | ICD-10-CM

## 2025-01-03 PROCEDURE — 77067 SCR MAMMO BI INCL CAD: CPT

## 2025-01-03 PROCEDURE — 77063 BREAST TOMOSYNTHESIS BI: CPT | Mod: 26

## 2025-01-03 PROCEDURE — 70486 CT MAXILLOFACIAL W/O DYE: CPT

## 2025-01-03 PROCEDURE — 77067 SCR MAMMO BI INCL CAD: CPT | Mod: 26

## 2025-01-03 PROCEDURE — 70486 CT MAXILLOFACIAL W/O DYE: CPT | Mod: 26

## 2025-01-03 PROCEDURE — 77063 BREAST TOMOSYNTHESIS BI: CPT

## 2025-01-06 ENCOUNTER — NON-APPOINTMENT (OUTPATIENT)
Age: 65
End: 2025-01-06

## 2025-01-24 ENCOUNTER — APPOINTMENT (OUTPATIENT)
Dept: INTERNAL MEDICINE | Facility: CLINIC | Age: 65
End: 2025-01-24

## 2025-01-24 VITALS
HEART RATE: 83 BPM | HEIGHT: 61.5 IN | SYSTOLIC BLOOD PRESSURE: 120 MMHG | DIASTOLIC BLOOD PRESSURE: 72 MMHG | WEIGHT: 114 LBS | TEMPERATURE: 98.3 F | BODY MASS INDEX: 21.25 KG/M2 | OXYGEN SATURATION: 98 %

## 2025-01-24 DIAGNOSIS — R73.01 IMPAIRED FASTING GLUCOSE: ICD-10-CM

## 2025-01-24 DIAGNOSIS — I10 ESSENTIAL (PRIMARY) HYPERTENSION: ICD-10-CM

## 2025-01-24 DIAGNOSIS — K86.89 OTHER SPECIFIED DISEASES OF PANCREAS: ICD-10-CM

## 2025-01-24 DIAGNOSIS — K21.9 GASTRO-ESOPHAGEAL REFLUX DISEASE W/OUT ESOPHAGITIS: ICD-10-CM

## 2025-01-24 DIAGNOSIS — E78.5 HYPERLIPIDEMIA, UNSPECIFIED: ICD-10-CM

## 2025-01-24 PROCEDURE — 99215 OFFICE O/P EST HI 40 MIN: CPT

## 2025-03-11 ENCOUNTER — APPOINTMENT (OUTPATIENT)
Dept: GASTROENTEROLOGY | Facility: CLINIC | Age: 65
End: 2025-03-11
Payer: MEDICAID

## 2025-03-11 ENCOUNTER — NON-APPOINTMENT (OUTPATIENT)
Age: 65
End: 2025-03-11

## 2025-03-11 VITALS
SYSTOLIC BLOOD PRESSURE: 138 MMHG | WEIGHT: 114 LBS | RESPIRATION RATE: 14 BRPM | BODY MASS INDEX: 21.52 KG/M2 | DIASTOLIC BLOOD PRESSURE: 76 MMHG | HEART RATE: 84 BPM | HEIGHT: 61 IN | OXYGEN SATURATION: 98 %

## 2025-03-11 DIAGNOSIS — R10.13 EPIGASTRIC PAIN: ICD-10-CM

## 2025-03-11 DIAGNOSIS — G89.29 EPIGASTRIC PAIN: ICD-10-CM

## 2025-03-11 DIAGNOSIS — K83.8 OTHER SPECIFIED DISEASES OF BILIARY TRACT: ICD-10-CM

## 2025-03-11 DIAGNOSIS — K86.89 OTHER SPECIFIED DISEASES OF PANCREAS: ICD-10-CM

## 2025-03-11 PROCEDURE — 99214 OFFICE O/P EST MOD 30 MIN: CPT

## 2025-03-11 PROCEDURE — G2211 COMPLEX E/M VISIT ADD ON: CPT | Mod: NC

## 2025-03-16 PROBLEM — R10.13 CHRONIC EPIGASTRIC PAIN: Status: ACTIVE | Noted: 2025-03-16

## 2025-03-19 ENCOUNTER — OUTPATIENT (OUTPATIENT)
Dept: OUTPATIENT SERVICES | Facility: HOSPITAL | Age: 65
LOS: 1 days | End: 2025-03-19
Payer: MEDICAID

## 2025-03-19 ENCOUNTER — APPOINTMENT (OUTPATIENT)
Dept: CT IMAGING | Facility: IMAGING CENTER | Age: 65
End: 2025-03-19
Payer: MEDICAID

## 2025-03-19 DIAGNOSIS — K83.8 OTHER SPECIFIED DISEASES OF BILIARY TRACT: ICD-10-CM

## 2025-03-19 DIAGNOSIS — K86.89 OTHER SPECIFIED DISEASES OF PANCREAS: ICD-10-CM

## 2025-03-19 DIAGNOSIS — M41.9 SCOLIOSIS, UNSPECIFIED: Chronic | ICD-10-CM

## 2025-03-19 PROCEDURE — 74160 CT ABDOMEN W/CONTRAST: CPT | Mod: 26

## 2025-03-19 PROCEDURE — 74160 CT ABDOMEN W/CONTRAST: CPT

## 2025-07-05 ENCOUNTER — NON-APPOINTMENT (OUTPATIENT)
Age: 65
End: 2025-07-05

## 2025-07-11 ENCOUNTER — APPOINTMENT (OUTPATIENT)
Facility: CLINIC | Age: 65
End: 2025-07-11

## 2025-07-11 VITALS
BODY MASS INDEX: 21.61 KG/M2 | HEIGHT: 61 IN | WEIGHT: 114.44 LBS | OXYGEN SATURATION: 97 % | HEART RATE: 89 BPM | RESPIRATION RATE: 16 BRPM | SYSTOLIC BLOOD PRESSURE: 150 MMHG | TEMPERATURE: 99.2 F | DIASTOLIC BLOOD PRESSURE: 100 MMHG

## 2025-07-11 PROCEDURE — 99213 OFFICE O/P EST LOW 20 MIN: CPT

## 2025-07-15 PROBLEM — J06.9 ACUTE URI: Status: ACTIVE | Noted: 2017-12-01

## 2025-08-14 DIAGNOSIS — Z11.1 ENCOUNTER FOR SCREENING FOR RESPIRATORY TUBERCULOSIS: ICD-10-CM

## 2025-08-19 LAB
M TB IFN-G BLD-IMP: NEGATIVE
QUANTIFERON TB PLUS MITOGEN MINUS NIL: >10 IU/ML
QUANTIFERON TB PLUS NIL: 0.02 IU/ML
QUANTIFERON TB PLUS TB1 MINUS NIL: 0 IU/ML
QUANTIFERON TB PLUS TB2 MINUS NIL: 0 IU/ML

## 2025-09-11 DIAGNOSIS — K86.89 OTHER SPECIFIED DISEASES OF PANCREAS: ICD-10-CM

## 2025-09-16 ENCOUNTER — APPOINTMENT (OUTPATIENT)
Dept: CT IMAGING | Facility: IMAGING CENTER | Age: 65
End: 2025-09-16
Payer: MEDICAID

## 2025-09-16 PROCEDURE — 74160 CT ABDOMEN W/CONTRAST: CPT | Mod: 26

## (undated) DEVICE — TUBING SUCTION 20FT

## (undated) DEVICE — CATH IV SAFE BC 20G X 1.16" (PINK)

## (undated) DEVICE — PACK IV START WITH CHG

## (undated) DEVICE — BITE BLOCK ADULT 20 X 27MM (GREEN)

## (undated) DEVICE — OLYMPUS DISTAL COVER ENDOSCOPE

## (undated) DEVICE — BALLOON US ENDO

## (undated) DEVICE — CATH IV SAFE BC 22G X 1" (BLUE)

## (undated) DEVICE — SENSOR O2 FINGER ADULT

## (undated) DEVICE — TUBING IV SET GRAVITY 3Y 100" MACRO

## (undated) DEVICE — SOL INJ NS 0.9% 500ML 2 PORT

## (undated) DEVICE — SUCTION YANKAUER NO CONTROL VENT

## (undated) DEVICE — SYR ALLIANCE II INFLATION 60ML

## (undated) DEVICE — FOLEY HOLDER STATLOCK 2 WAY ADULT

## (undated) DEVICE — TUBING SUCTION CONN 6FT STERILE